# Patient Record
Sex: MALE | Race: ASIAN | NOT HISPANIC OR LATINO | Employment: FULL TIME | URBAN - METROPOLITAN AREA
[De-identification: names, ages, dates, MRNs, and addresses within clinical notes are randomized per-mention and may not be internally consistent; named-entity substitution may affect disease eponyms.]

---

## 2019-07-25 ENCOUNTER — OFFICE VISIT - HEALTHEAST (OUTPATIENT)
Dept: FAMILY MEDICINE | Facility: CLINIC | Age: 44
End: 2019-07-25

## 2019-07-25 DIAGNOSIS — M45.5 ANKYLOSING SPONDYLITIS OF THORACOLUMBAR REGION (H): ICD-10-CM

## 2019-07-25 DIAGNOSIS — Z00.00 ROUTINE GENERAL MEDICAL EXAMINATION AT A HEALTH CARE FACILITY: ICD-10-CM

## 2019-07-25 ASSESSMENT — MIFFLIN-ST. JEOR: SCORE: 1746.37

## 2019-09-09 ENCOUNTER — OFFICE VISIT - HEALTHEAST (OUTPATIENT)
Dept: RHEUMATOLOGY | Facility: CLINIC | Age: 44
End: 2019-09-09

## 2019-09-09 DIAGNOSIS — M54.41 CHRONIC MIDLINE LOW BACK PAIN WITH RIGHT-SIDED SCIATICA: ICD-10-CM

## 2019-09-09 DIAGNOSIS — G89.29 CHRONIC MIDLINE LOW BACK PAIN WITH RIGHT-SIDED SCIATICA: ICD-10-CM

## 2019-09-09 DIAGNOSIS — M45.0 ANKYLOSING SPONDYLITIS OF MULTIPLE SITES IN SPINE (H): ICD-10-CM

## 2019-09-09 LAB
ALBUMIN SERPL-MCNC: 3.3 G/DL (ref 3.5–5)
ALT SERPL W P-5'-P-CCNC: 13 U/L (ref 0–45)
BASOPHILS # BLD AUTO: 0.1 THOU/UL (ref 0–0.2)
BASOPHILS NFR BLD AUTO: 1 % (ref 0–2)
C REACTIVE PROTEIN LHE: 5.2 MG/DL (ref 0–0.8)
CREAT SERPL-MCNC: 0.73 MG/DL (ref 0.7–1.3)
EOSINOPHIL # BLD AUTO: 0.3 THOU/UL (ref 0–0.4)
EOSINOPHIL NFR BLD AUTO: 3 % (ref 0–6)
ERYTHROCYTE [DISTWIDTH] IN BLOOD BY AUTOMATED COUNT: 11.1 % (ref 11–14.5)
ERYTHROCYTE [SEDIMENTATION RATE] IN BLOOD BY WESTERGREN METHOD: 80 MM/HR (ref 0–15)
GFR SERPL CREATININE-BSD FRML MDRD: >60 ML/MIN/1.73M2
HCT VFR BLD AUTO: 39.8 % (ref 40–54)
HGB BLD-MCNC: 13.4 G/DL (ref 14–18)
LYMPHOCYTES # BLD AUTO: 2.4 THOU/UL (ref 0.8–4.4)
LYMPHOCYTES NFR BLD AUTO: 24 % (ref 20–40)
MCH RBC QN AUTO: 28.7 PG (ref 27–34)
MCHC RBC AUTO-ENTMCNC: 33.6 G/DL (ref 32–36)
MCV RBC AUTO: 85 FL (ref 80–100)
MONOCYTES # BLD AUTO: 0.6 THOU/UL (ref 0–0.9)
MONOCYTES NFR BLD AUTO: 6 % (ref 2–10)
NEUTROPHILS # BLD AUTO: 6.6 THOU/UL (ref 2–7.7)
NEUTROPHILS NFR BLD AUTO: 66 % (ref 50–70)
PLATELET # BLD AUTO: 353 THOU/UL (ref 140–440)
PMV BLD AUTO: 7 FL (ref 7–10)
RBC # BLD AUTO: 4.66 MILL/UL (ref 4.4–6.2)
WBC: 10 THOU/UL (ref 4–11)

## 2019-09-09 ASSESSMENT — MIFFLIN-ST. JEOR: SCORE: 1745.36

## 2019-09-10 LAB
ANA SER QL: 0.1 U
HBV SURFACE AG SERPL QL IA: NEGATIVE
HCV AB SERPL QL IA: NEGATIVE

## 2019-09-12 LAB
B LOCUS: NORMAL
B27TEST METHOD: NORMAL

## 2019-09-23 ENCOUNTER — OFFICE VISIT - HEALTHEAST (OUTPATIENT)
Dept: RHEUMATOLOGY | Facility: CLINIC | Age: 44
End: 2019-09-23

## 2019-09-23 ENCOUNTER — OFFICE VISIT - HEALTHEAST (OUTPATIENT)
Dept: FAMILY MEDICINE | Facility: CLINIC | Age: 44
End: 2019-09-23

## 2019-09-23 DIAGNOSIS — M54.41 CHRONIC MIDLINE LOW BACK PAIN WITH RIGHT-SIDED SCIATICA: ICD-10-CM

## 2019-09-23 DIAGNOSIS — N52.8 OTHER MALE ERECTILE DYSFUNCTION: ICD-10-CM

## 2019-09-23 DIAGNOSIS — Z00.00 ROUTINE GENERAL MEDICAL EXAMINATION AT A HEALTH CARE FACILITY: ICD-10-CM

## 2019-09-23 DIAGNOSIS — M45.0 ANKYLOSING SPONDYLITIS OF MULTIPLE SITES IN SPINE (H): ICD-10-CM

## 2019-09-23 DIAGNOSIS — M19.90 ARTHRITIS: ICD-10-CM

## 2019-09-23 DIAGNOSIS — G89.29 CHRONIC MIDLINE LOW BACK PAIN WITH RIGHT-SIDED SCIATICA: ICD-10-CM

## 2019-09-23 LAB
ALBUMIN SERPL-MCNC: 3.2 G/DL (ref 3.5–5)
ALBUMIN UR-MCNC: ABNORMAL MG/DL
ALP SERPL-CCNC: 78 U/L (ref 45–120)
ALT SERPL W P-5'-P-CCNC: 11 U/L (ref 0–45)
ANION GAP SERPL CALCULATED.3IONS-SCNC: 10 MMOL/L (ref 5–18)
APPEARANCE UR: CLEAR
AST SERPL W P-5'-P-CCNC: 13 U/L (ref 0–40)
BACTERIA #/AREA URNS HPF: ABNORMAL HPF
BILIRUB SERPL-MCNC: 0.2 MG/DL (ref 0–1)
BILIRUB UR QL STRIP: NEGATIVE
BUN SERPL-MCNC: 18 MG/DL (ref 8–22)
CALCIUM SERPL-MCNC: 9 MG/DL (ref 8.5–10.5)
CHLORIDE BLD-SCNC: 106 MMOL/L (ref 98–107)
CO2 SERPL-SCNC: 24 MMOL/L (ref 22–31)
COLOR UR AUTO: YELLOW
CREAT SERPL-MCNC: 0.73 MG/DL (ref 0.7–1.3)
GFR SERPL CREATININE-BSD FRML MDRD: >60 ML/MIN/1.73M2
GLUCOSE BLD-MCNC: 96 MG/DL (ref 70–125)
GLUCOSE UR STRIP-MCNC: NEGATIVE MG/DL
HBA1C MFR BLD: 6.1 % (ref 3.5–6)
HGB UR QL STRIP: ABNORMAL
KETONES UR STRIP-MCNC: NEGATIVE MG/DL
LEUKOCYTE ESTERASE UR QL STRIP: NEGATIVE
MUCOUS THREADS #/AREA URNS LPF: ABNORMAL LPF
NITRATE UR QL: NEGATIVE
PH UR STRIP: 5 [PH] (ref 5–8)
POTASSIUM BLD-SCNC: 4.1 MMOL/L (ref 3.5–5)
PROT SERPL-MCNC: 7.3 G/DL (ref 6–8)
RBC #/AREA URNS AUTO: ABNORMAL HPF
SODIUM SERPL-SCNC: 140 MMOL/L (ref 136–145)
SP GR UR STRIP: >=1.03 (ref 1–1.03)
SPERM #/AREA URNS HPF: PRESENT /[HPF]
SQUAMOUS #/AREA URNS AUTO: ABNORMAL LPF
UROBILINOGEN UR STRIP-ACNC: ABNORMAL
WBC #/AREA URNS AUTO: ABNORMAL HPF

## 2019-09-23 ASSESSMENT — MIFFLIN-ST. JEOR: SCORE: 1760.32

## 2019-09-24 ENCOUNTER — COMMUNICATION - HEALTHEAST (OUTPATIENT)
Dept: RHEUMATOLOGY | Facility: CLINIC | Age: 44
End: 2019-09-24

## 2019-09-24 ENCOUNTER — COMMUNICATION - HEALTHEAST (OUTPATIENT)
Dept: FAMILY MEDICINE | Facility: CLINIC | Age: 44
End: 2019-09-24

## 2019-09-24 DIAGNOSIS — M45.0 ANKYLOSING SPONDYLITIS OF MULTIPLE SITES IN SPINE (H): ICD-10-CM

## 2019-09-24 LAB
HBV SURFACE AG SERPL QL IA: NEGATIVE
HCV AB SERPL QL IA: NEGATIVE

## 2019-09-25 LAB
GAMMA INTERFERON BACKGROUND BLD IA-ACNC: 0.1 IU/ML
M TB IFN-G BLD-IMP: NEGATIVE
MITOGEN IGNF BCKGRD COR BLD-ACNC: -0.03 IU/ML
MITOGEN IGNF BCKGRD COR BLD-ACNC: -0.04 IU/ML
QTF INTERPRETATION: NORMAL
QTF MITOGEN - NIL: 5.61 IU/ML

## 2019-09-30 ENCOUNTER — HEALTH MAINTENANCE LETTER (OUTPATIENT)
Age: 44
End: 2019-09-30

## 2019-11-07 ENCOUNTER — COMMUNICATION - HEALTHEAST (OUTPATIENT)
Dept: FAMILY MEDICINE | Facility: CLINIC | Age: 44
End: 2019-11-07

## 2019-11-07 DIAGNOSIS — M45.5 ANKYLOSING SPONDYLITIS OF THORACOLUMBAR REGION (H): ICD-10-CM

## 2019-11-07 DIAGNOSIS — Z00.00 ROUTINE GENERAL MEDICAL EXAMINATION AT A HEALTH CARE FACILITY: ICD-10-CM

## 2020-01-06 ENCOUNTER — OFFICE VISIT - HEALTHEAST (OUTPATIENT)
Dept: RHEUMATOLOGY | Facility: CLINIC | Age: 45
End: 2020-01-06

## 2020-01-06 DIAGNOSIS — Z96.643 HISTORY OF BILATERAL HIP ARTHROPLASTY: ICD-10-CM

## 2020-01-06 DIAGNOSIS — M45.8 ANKYLOSING SPONDYLITIS OF SACRAL REGION (H): ICD-10-CM

## 2020-01-06 DIAGNOSIS — M47.816 LUMBAR SPONDYLOSIS: ICD-10-CM

## 2020-01-06 LAB
ALBUMIN SERPL-MCNC: 3.8 G/DL (ref 3.5–5)
ALT SERPL W P-5'-P-CCNC: 26 U/L (ref 0–45)
CREAT SERPL-MCNC: 0.79 MG/DL (ref 0.7–1.3)
ERYTHROCYTE [DISTWIDTH] IN BLOOD BY AUTOMATED COUNT: 12.6 % (ref 11–14.5)
GFR SERPL CREATININE-BSD FRML MDRD: >60 ML/MIN/1.73M2
HCT VFR BLD AUTO: 48.4 % (ref 40–54)
HGB BLD-MCNC: 16.1 G/DL (ref 14–18)
MCH RBC QN AUTO: 29.7 PG (ref 27–34)
MCHC RBC AUTO-ENTMCNC: 33.3 G/DL (ref 32–36)
MCV RBC AUTO: 89 FL (ref 80–100)
PLATELET # BLD AUTO: 238 THOU/UL (ref 140–440)
PMV BLD AUTO: 7.8 FL (ref 7–10)
RBC # BLD AUTO: 5.42 MILL/UL (ref 4.4–6.2)
WBC: 7.4 THOU/UL (ref 4–11)

## 2020-01-06 ASSESSMENT — MIFFLIN-ST. JEOR: SCORE: 1791.17

## 2020-01-07 ENCOUNTER — COMMUNICATION - HEALTHEAST (OUTPATIENT)
Dept: RHEUMATOLOGY | Facility: CLINIC | Age: 45
End: 2020-01-07

## 2020-03-06 ENCOUNTER — COMMUNICATION - HEALTHEAST (OUTPATIENT)
Dept: FAMILY MEDICINE | Facility: CLINIC | Age: 45
End: 2020-03-06

## 2020-03-06 DIAGNOSIS — M45.5 ANKYLOSING SPONDYLITIS OF THORACOLUMBAR REGION (H): ICD-10-CM

## 2020-03-06 DIAGNOSIS — Z00.00 ROUTINE GENERAL MEDICAL EXAMINATION AT A HEALTH CARE FACILITY: ICD-10-CM

## 2020-07-06 ENCOUNTER — AMBULATORY - HEALTHEAST (OUTPATIENT)
Dept: LAB | Facility: CLINIC | Age: 45
End: 2020-07-06

## 2020-07-06 DIAGNOSIS — M45.8 ANKYLOSING SPONDYLITIS OF SACRAL REGION (H): ICD-10-CM

## 2020-07-06 LAB
ALBUMIN SERPL-MCNC: 3.9 G/DL (ref 3.5–5)
ALT SERPL W P-5'-P-CCNC: 29 U/L (ref 0–45)
CREAT SERPL-MCNC: 0.8 MG/DL (ref 0.7–1.3)
ERYTHROCYTE [DISTWIDTH] IN BLOOD BY AUTOMATED COUNT: 11.7 % (ref 11–14.5)
GFR SERPL CREATININE-BSD FRML MDRD: >60 ML/MIN/1.73M2
HCT VFR BLD AUTO: 46 % (ref 40–54)
HGB BLD-MCNC: 16.1 G/DL (ref 14–18)
MCH RBC QN AUTO: 31.2 PG (ref 27–34)
MCHC RBC AUTO-ENTMCNC: 35 G/DL (ref 32–36)
MCV RBC AUTO: 89 FL (ref 80–100)
PLATELET # BLD AUTO: 225 THOU/UL (ref 140–440)
PMV BLD AUTO: 7.8 FL (ref 7–10)
RBC # BLD AUTO: 5.17 MILL/UL (ref 4.4–6.2)
WBC: 7 THOU/UL (ref 4–11)

## 2020-07-09 ENCOUNTER — OFFICE VISIT - HEALTHEAST (OUTPATIENT)
Dept: RHEUMATOLOGY | Facility: CLINIC | Age: 45
End: 2020-07-09

## 2020-07-09 DIAGNOSIS — Z96.643 HISTORY OF BILATERAL HIP ARTHROPLASTY: ICD-10-CM

## 2020-07-09 DIAGNOSIS — M45.8 ANKYLOSING SPONDYLITIS OF SACRAL REGION (H): ICD-10-CM

## 2020-07-09 DIAGNOSIS — M47.816 LUMBAR SPONDYLOSIS: ICD-10-CM

## 2020-10-01 ENCOUNTER — COMMUNICATION - HEALTHEAST (OUTPATIENT)
Dept: RHEUMATOLOGY | Facility: CLINIC | Age: 45
End: 2020-10-01

## 2020-10-01 DIAGNOSIS — M45.8 ANKYLOSING SPONDYLITIS OF SACRAL REGION (H): ICD-10-CM

## 2020-10-02 ENCOUNTER — COMMUNICATION - HEALTHEAST (OUTPATIENT)
Dept: RHEUMATOLOGY | Facility: CLINIC | Age: 45
End: 2020-10-02

## 2021-01-15 ENCOUNTER — HEALTH MAINTENANCE LETTER (OUTPATIENT)
Age: 46
End: 2021-01-15

## 2021-02-25 ENCOUNTER — COMMUNICATION - HEALTHEAST (OUTPATIENT)
Dept: FAMILY MEDICINE | Facility: CLINIC | Age: 46
End: 2021-02-25

## 2021-02-25 DIAGNOSIS — M45.5 ANKYLOSING SPONDYLITIS OF THORACOLUMBAR REGION (H): ICD-10-CM

## 2021-02-25 DIAGNOSIS — Z00.00 ROUTINE GENERAL MEDICAL EXAMINATION AT A HEALTH CARE FACILITY: ICD-10-CM

## 2021-04-12 ENCOUNTER — COMMUNICATION - HEALTHEAST (OUTPATIENT)
Dept: RHEUMATOLOGY | Facility: CLINIC | Age: 46
End: 2021-04-12

## 2021-04-12 DIAGNOSIS — M45.8 ANKYLOSING SPONDYLITIS OF SACRAL REGION (H): ICD-10-CM

## 2021-04-15 ENCOUNTER — COMMUNICATION - HEALTHEAST (OUTPATIENT)
Dept: ADMINISTRATIVE | Facility: CLINIC | Age: 46
End: 2021-04-15

## 2021-04-21 ENCOUNTER — AMBULATORY - HEALTHEAST (OUTPATIENT)
Dept: LAB | Facility: CLINIC | Age: 46
End: 2021-04-21

## 2021-04-21 DIAGNOSIS — M45.8 ANKYLOSING SPONDYLITIS OF SACRAL REGION (H): ICD-10-CM

## 2021-04-21 LAB
ALBUMIN SERPL-MCNC: 4 G/DL (ref 3.5–5)
ALT SERPL W P-5'-P-CCNC: 27 U/L (ref 0–45)
CREAT SERPL-MCNC: 0.8 MG/DL (ref 0.7–1.3)
ERYTHROCYTE [DISTWIDTH] IN BLOOD BY AUTOMATED COUNT: 11.6 % (ref 11–14.5)
GFR SERPL CREATININE-BSD FRML MDRD: >60 ML/MIN/1.73M2
HCT VFR BLD AUTO: 47.2 % (ref 40–54)
HGB BLD-MCNC: 15.9 G/DL (ref 14–18)
MCH RBC QN AUTO: 29.9 PG (ref 27–34)
MCHC RBC AUTO-ENTMCNC: 33.7 G/DL (ref 32–36)
MCV RBC AUTO: 89 FL (ref 80–100)
PLATELET # BLD AUTO: 225 THOU/UL (ref 140–440)
PMV BLD AUTO: 9.4 FL (ref 7–10)
RBC # BLD AUTO: 5.31 MILL/UL (ref 4.4–6.2)
WBC: 7.2 THOU/UL (ref 4–11)

## 2021-04-22 ENCOUNTER — COMMUNICATION - HEALTHEAST (OUTPATIENT)
Dept: ADMINISTRATIVE | Facility: CLINIC | Age: 46
End: 2021-04-22

## 2021-05-04 ENCOUNTER — RECORDS - HEALTHEAST (OUTPATIENT)
Dept: RHEUMATOLOGY | Facility: CLINIC | Age: 46
End: 2021-05-04

## 2021-05-11 ENCOUNTER — OFFICE VISIT - HEALTHEAST (OUTPATIENT)
Dept: RHEUMATOLOGY | Facility: CLINIC | Age: 46
End: 2021-05-11

## 2021-05-11 DIAGNOSIS — Z96.643 HISTORY OF BILATERAL HIP ARTHROPLASTY: ICD-10-CM

## 2021-05-11 DIAGNOSIS — M47.816 LUMBAR SPONDYLOSIS: ICD-10-CM

## 2021-05-11 DIAGNOSIS — M45.8 ANKYLOSING SPONDYLITIS OF SACRAL REGION (H): ICD-10-CM

## 2021-05-12 ENCOUNTER — COMMUNICATION - HEALTHEAST (OUTPATIENT)
Dept: RHEUMATOLOGY | Facility: CLINIC | Age: 46
End: 2021-05-12

## 2021-05-30 NOTE — PROGRESS NOTES
Assessment:     1. Ankylosing spondylitis of thoracolumbar region (H)  Ambulatory referral to Rheumatology    naproxen (NAPROSYN) 500 MG tablet   2. Routine general medical examination at a health care facility  Ambulatory referral to Rheumatology    naproxen (NAPROSYN) 500 MG tablet       Plan:     1. Ankylosing spondylitis of thoracolumbar region (H)  Patient will discontinue ibuprofen products and Celebrex taken extra strength Tylenol in addition to the following; we discouraged the use of his oxycodone tablets which were prescribed for hemorrhoid treatment  - Ambulatory referral to Rheumatology  - naproxen (NAPROSYN) 500 MG tablet; Take 1 tablet (500 mg total) by mouth 2 (two) times a day with meals.  Dispense: 60 tablet; Refill: 2    2. Routine general medical examination at a health care facility  Patient will return for complete examination after seeing rheumatology  - Ambulatory referral to Rheumatology  - naproxen (NAPROSYN) 500 MG tablet; Take 1 tablet (500 mg total) by mouth 2 (two) times a day with meals.  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  Patient has a long history of severe ankylosing spondylitis of his thoracolumbar spine and has been on multiple anti-inflammatories over the last few years but under the care of [facility].  He can no longer see his rheumatologist and that plan and is looking to establish care here at [facility] with primary care as well as rheumatology.  Patient is in moderate to moderately severe pain.  He recently had a thrombosed hemorrhoid I&D done at [facility] and has some Percocet tablets which she is taking for pain in addition to Celebrex and round-the-clock ibuprofen because he is having a flare of severe stiffness with his ankylosing spondylitis.  Patient has been on Enbrel shots but no  "longer can take it because it is not covered by [employer] insurance and this is his new employer.  He now works for the [employer] system.  We will get him a referral to Dr. Yovani Moe I in the meantime have placed him on some Naprosyn 500 twice daily with an extra strength Tylenol discontinue the Celebrex and the round-the-clock ibuprofen.  I am concerned he may be getting some liver damage and the risk of GI disturbance with so much ibuprofen is present.  Patient had previous knee surgery as a child.  He has not really had a primary care examination in many years.  We really need to get an emergent type of rheumatology consultation and perhaps some corticosteroid treatment for him.  Also rheumatology may be able to source his Enbrel or at least provide a pharmacologic evaluation where we may get him covered again through his insurance.  I will follow along with our consultants and see him for primary care evaluation.  System review otherwise unremarkable patient wears glasses he denies any hearing loss dysphasia shortness of breath dyspnea chest pain no abdominal pain diarrhea constipation no real side effects from Enbrel although we do not have his clinical records able to download here at this moment.  If and when he returns for primary care complete examination we will of course have had opportunity to review his entire past medical history.  40-minute examination face-to-face today.    Review of Systems: A complete 14 point review of systems was obtained and is negative or as stated in the history of present illness.    No past medical history on file.  No family history on file.  No past surgical history on file.  Social History     Tobacco Use     Smoking status: Never Smoker     Smokeless tobacco: Never Used   Substance Use Topics     Alcohol use: Not on file     Drug use: Not on file         Objective:   /76   Pulse 67   Ht 5' 9.75\" (1.772 m)   Wt 191 lb 9.6 oz (86.9 kg)   SpO2 97%   BMI " 27.69 kg/m      General Appearance:  Alert, cooperative, no distress  Head:  Normocephalic, no obvious abnormality  Ears: TM anatomy normal  Eyes:  PERRL, EOM's intact, conjunctiva and corneas clear  Nose:  Nares symmetrical, septum midline, mucosa pink, no sinus tenderness  Throat:  Lips, tongue, and mucosa are moist, pink, and intact  Neck:  Supple, symmetrical, trachea midline, no adenopathy; thyroid: no enlargement, symmetric,no tenderness/mass/nodules; no carotid bruit, no JVD  Back:  Symmetrical, no curvature, ROM normal, no CVA tenderness  Chest/Breast:  No mass or tenderness  Lungs:  Clear to auscultation bilaterally, respirations unlabored   Heart:  Normal PMI, regular rate & rhythm, S1 and S2 normal, no murmurs, rubs, or gallops  Abdomen:  Soft, non-tender, bowel sounds active all four quadrants, no mass, or organomegaly  Musculoskeletal:  Tone and strength strong and symmetrical, all extremities left knee has jagged well-healed scar from previous knee surgery  Lymphatic:  No adenopathy  Skin/Hair/Nails:  Skin warm, dry, and intact, no rashes  Neurologic:  Alert and oriented x3, no cranial nerve deficits, normal strength and tone, gait steady  Extremities:  No edema.  Jase's sign negative.    Genitourinary: deferred  Pulses:  Equal bilaterally           This note has been dictated using voice recognition software. Any grammatical or context distortions are unintentional and inherent to the the software.

## 2021-06-01 NOTE — PROGRESS NOTES
ASSESSMENT AND PLAN:  Jesus Olivares 44 y.o. male is seen here on 09/09/19 for evaluation of back pain, previous diagnosis of ankylosing spondylitis that was arrived back in 1994, bilateral hip arthroplasty 1997 June in December, right and then left, with modest improvement with naproxen, being of Enbrel since April of this year due to insurance reasons.  When he is on Enbrel he noted 90% improvement in his symptoms.  I have had the opportunity to review his data going back to several years from the time he was seen at the [facility] and subsequently at [facility].  Reviewed the x-ray reports from early 2000.  Today we discussed various aspects of his care and symptoms.  I have suggested that we take basic labs as noted, and x-rays.  Diagnoses and all orders for this visit:    Chronic midline low back pain with right-sided sciatica  -     HM1(CBC and Differential)  -     Creatinine  -     ALT (SGPT)  -     Albumin  -     Hepatitis C Antibody (Anti-HCV)  -     Erythrocyte Sedimentation Rate  -     C-Reactive Protein  -     Antinuclear Antibody (CAMMIE) Cascade  -     Hepatitis B Surface Antigen (HBsAG)  -     XR Lumbar Spine 2 or 3 VWS; Future; Expected date: 09/09/2019  -     XR Sacroliac Joints 3 Or More VWS; Future; Expected date: 09/09/2019  -     XR Cervical Spine 2 - 3 VWS; Future; Expected date: 09/09/2019  -     XR Thoracic Spine 3 VWS; Future; Expected date: 09/09/2019  -     XR Lumbar Spine 2 or 3 VWS  -     XR Sacroliac Joints 3 Or More VWS  -     XR Cervical Spine 2 - 3 VWS  -     XR Thoracic Spine 3 VWS  -     HM1 (CBC with Diff)  -     HLA-B27 Antigen    Ankylosing spondylitis of multiple sites in spine (H)  -     HM1(CBC and Differential)  -     Creatinine  -     ALT (SGPT)  -     Albumin  -     Hepatitis C Antibody (Anti-HCV)  -     Erythrocyte Sedimentation Rate  -     C-Reactive Protein  -     Antinuclear Antibody (CAMMIE) Cascade  -     Hepatitis B Surface Antigen (HBsAG)  -     XR  Lumbar Spine 2 or 3 VWS; Future; Expected date: 09/09/2019  -     XR Sacroliac Joints 3 Or More VWS; Future; Expected date: 09/09/2019  -     XR Cervical Spine 2 - 3 VWS; Future; Expected date: 09/09/2019  -     XR Thoracic Spine 3 VWS; Future; Expected date: 09/09/2019  -     XR Lumbar Spine 2 or 3 VWS  -     XR Sacroliac Joints 3 Or More VWS  -     XR Cervical Spine 2 - 3 VWS  -     XR Thoracic Spine 3 VWS  -     HM1 (CBC with Diff)  -     HLA-B27 Antigen      HISTORY OF PRESENTING ILLNESS:  Jesus Olivares, 44 y.o., male is here for establishment of care of back pain, diagnosis of ankylosing spondylitis.  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  He reports that his symptoms began in 1994.  He recalls pain having started on the right hip area he points to the trochanteric region than the left side was affected to, over the subsequent years he had tried various modalities including sulfasalazine, by June 1997 he had a right hip arthroplasty and in the same year and December the left hip arthroplasty.  He recalls initially he was started on sulfasalazine nonsteroidals which provided him some relief.  Then he was given Enbrel that he found quite helpful Humira was tried it that was not of much use he was then put back on Enbrel that he has been on for many years apart from the intermittent times aware the insurance change necessitated not taking it such as now.  His most recent Enbrel intake was in April of this year.  Over the past 5 months he has managed the symptoms taking naproxen, Tylenol.  This provides him with perhaps a 40% relief.  He noted that some of the worst pains are in the neck between this shoulder blades in the lower back.  When he sits for too long the pain does radiate down the right leg sometimes all the way to the foot.  Nighttime the pain is severe enough to keep him up, morning stiffness can last 2 to 3 hours.  He has not observed pain in other  joint areas in any of the appendicular system.  He has not observed swelling.  He has not seen features suggestive of dactylitis.  He has no features suggestive of enthesitis of the peripheral joints.  He recalls no features suggestive of uveitis/iritis, personal or family history of psoriasis ulcerative colitis or Crohn's disease.  When he was on Enbrel he felt that there was a 90% improvement in his pain and stiffness.  He is not a smoker alcohol 1-2 times per week socially.  Likes to swim and walk regularly.  He has had some constipation and the GI symptoms, fatigue, generalized weakness, headaches and dizziness, hair loss.   Further historical information and ADL limitations as noted in the multidimensional health assessment questionnaire attached in the EMR. Rest of the 13 system ROS is negative.     ALLERGIES:Patient has no known allergies.    PAST MEDICAL/ACTIVE PROBLEMS/MEDICATION/ FAMILY HISTORY/SOCIAL DATA:  The patient has a family history of  No past medical history on file.  Social History     Tobacco Use   Smoking Status Never Smoker   Smokeless Tobacco Never Used     There is no problem list on file for this patient.    Current Outpatient Medications   Medication Sig Dispense Refill     acetaminophen (TYLENOL) 500 MG tablet Take 500 mg by mouth every 6 (six) hours as needed for pain.       celecoxib (CELEBREX) 200 MG capsule Take 200 mg by mouth 2 (two) times a day.       ibuprofen (ADVIL,MOTRIN) 800 MG tablet Take 800 mg by mouth every 6 (six) hours as needed for pain.       naproxen (NAPROSYN) 500 MG tablet Take 1 tablet (500 mg total) by mouth 2 (two) times a day with meals. 60 tablet 2     oxyCODONE (ROXICODONE) 5 MG immediate release tablet Take 5 mg by mouth every 4 (four) hours as needed for pain.       No current facility-administered medications for this visit.        COMPREHENSIVE EXAMINATION:  Vitals:    09/09/19 0900   BP: (!) 120/100   Patient Site: Right Arm   Patient Position: Sitting  "  Cuff Size: Adult Large   Pulse: 84   Weight: 194 lb (88 kg)   Height: 5' 9\" (1.753 m)     A well appearing alert oriented male. Vital data as noted above. His eyes without inflammation/scleromalacia. ENTwithout oral mucositis, thrush, nasal deformity, external ear redness, deformity. His neck is without lymphadenopathy and supple. Lungs normal sounds, no pleural rub. Heart auscultation normal rate, rhythm; no pericardial rub and murmurs. Abdomen soft, non tender, no organomegaly. Skin examined for heliotrope, malar area eruption, lupus pernio, periungual erythema, sclerodactyly, papules, erythema nodosum, purpura, nail pitting, onycholysis, and obvious psoriasis lesion. Neurological examination shows normal alertness, speech, facial symmetry, tone and power in upper and lower extremities. The joint examination is performed for swelling, tenderness, warmth, erythema, and range of motion in the following joints: DIPs, PIPs, MCPs, wrists, first CMC's, elbows, shoulders, hips, knees, ankles, feet; spine for range of motion and paraspinal muscles for tenderness. The salient  findings are: He does not have synovitis in any of the palpable joints of upper and lower extremities.  There is no dactylitis of digits or toes.  There is no enthesitis around the Achilles, knees.  His occiput to wall is 0.  Schober's goes from 10 cm to 14.5.  Lateral rotation is intact.    LAB / IMAGING DATA:  No results found for: ALT, ALBUMIN, CREATININE    No results found for: WBC, HGB, PLT    No results found for: CAMMIE, RF, SEDRATE       "

## 2021-06-01 NOTE — PROGRESS NOTES
"ASSESSMENT AND PLAN:  Jesus Olivares 44 y.o. male is seen here on 09/23/19 for follow-up.  He has uncontrolled ankylosing spondylitis, first diagnosed 20 years ago at [facility], with the damage to the sacroiliac joints, which had been very well controlled with Enbrel, \"90% improvement\" however the insurance does not cover it anymore.  He wonders about other options, Cosentyx as discussed.  Major side effects outlined.  Follow-up 3 months.       Diagnoses and all orders for this visit:    Ankylosing spondylitis of multiple sites in spine (H)  -     QTF-Mycobacterium tuberculosis by QuantiFERON-TB Gold Plus  -     Hepatitis C Antibody (Anti-HCV)  -     Hepatitis B Surface Antigen (HBsAG)  -     secukinumab (COSENTYX) 150 mg/mL Syrg; Inject 150 mg under the skin every 7 days for 5 days. And then 150 mg q. 4-week.  Dispense: 5 Syringe; Refill: 1    Chronic midline low back pain with right-sided sciatica  -     QTF-Mycobacterium tuberculosis by QuantiFERON-TB Gold Plus  -     Hepatitis C Antibody (Anti-HCV)  -     Hepatitis B Surface Antigen (HBsAG)      HISTORY OF PRESENTING ILLNESS:  Jesus Olivares, 44 y.o., male is here for follow-up of recent visit for establishment of care of back pain, diagnosis of ankylosing spondylitis.  Recent work-up was reviewed.  His pain continues.  He has not been able to get Enbrel for insurance reasons.  He has taken over-the-counter measures including 800 mg of ibuprofen without much help.  Naproxen does not take the edge off.  He has elevated sed rate and CRP.  X-rays of the sacroiliac joints show sacroiliitis.  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  He reports that his symptoms began in 1994.  He recalls pain having started on the right hip area he points to the trochanteric region than the left side was affected to, over the subsequent years he had tried various modalities including sulfasalazine, by June 1997 he had a " right hip arthroplasty and in the same year and December the left hip arthroplasty.  He recalls initially he was started on sulfasalazine nonsteroidals which provided him some relief.  Then he was given Enbrel that he found quite helpful Humira was tried it that was not of much use he was then put back on Enbrel that he has been on for many years apart from the intermittent times aware the insurance change necessitated not taking it such as now.  His most recent Enbrel intake was in April of this year.  Over the past 5 months he has managed the symptoms taking naproxen, Tylenol.  This provides him with perhaps a 40% relief.  He noted that some of the worst pains are in the neck between this shoulder blades in the lower back.  When he sits for too long the pain does radiate down the right leg sometimes all the way to the foot.  Nighttime the pain is severe enough to keep him up, morning stiffness can last 2 to 3 hours.  He has not observed pain in other joint areas in any of the appendicular system.  He has not observed swelling.  He has not seen features suggestive of dactylitis.  He has no features suggestive of enthesitis of the peripheral joints.  He recalls no features suggestive of uveitis/iritis, personal or family history of psoriasis ulcerative colitis or Crohn's disease.  When he was on Enbrel he felt that there was a 90% improvement in his pain and stiffness.  He is not a smoker alcohol 1-2 times per week socially.  Likes to swim and walk regularly.  He has had some constipation and the GI symptoms, fatigue, generalized weakness, headaches and dizziness, hair loss.   Further historical information and ADL limitations as noted in the multidimensional health assessment questionnaire attached in the EMR.  ALLERGIES:Patient has no known allergies.    PAST MEDICAL/ACTIVE PROBLEMS/MEDICATION/ FAMILY HISTORY/SOCIAL DATA:  The patient has a family history of  No past medical history on file.  Social History      Tobacco Use   Smoking Status Never Smoker   Smokeless Tobacco Never Used     There is no problem list on file for this patient.    Current Outpatient Medications   Medication Sig Dispense Refill     acetaminophen (TYLENOL) 500 MG tablet Take 500 mg by mouth every 6 (six) hours as needed for pain.       sildenafil (REVATIO) 20 mg tablet Take 1-5 tablets on demand one hour prior to planned intercourse 30 tablet 6     No current facility-administered medications for this visit.      DETAILED EXAMINATION  09/23/19  :  Vitals:    09/23/19 1043   BP: 112/68   Patient Site: Right Arm   Patient Position: Sitting   Cuff Size: Adult Large   Pulse: 80   Weight: 193 lb (87.5 kg)     Alert oriented. Head including the face is examined for malar rash, heliotropes, scarring, lupus pernio. Eyes examined for redness such as in episcleritis/scleritis, periorbital lesions.   Neck/ Face examined for parotid gland swelling, range of motion of neck.  Left upper and lower and right upper and lower extremities examined for tenderness, swelling, warmth of the appendicular joints, range of motion, edema, rash.  Some of the important findings included: he does not have evidence of synovitis in the palpable joints of the upper extremities.  No significant deformities of the digits.  no Heberden nodes.  Range of motion of the shoulders show full abduction.  No JLT effusion or warmth of the knees.  No synovitis and palpable joints of upper extremities kne     LAB / IMAGING DATA:  ALT   Date Value Ref Range Status   09/09/2019 13 0 - 45 U/L Final     Albumin   Date Value Ref Range Status   09/09/2019 3.3 (L) 3.5 - 5.0 g/dL Final     Creatinine   Date Value Ref Range Status   09/09/2019 0.73 0.70 - 1.30 mg/dL Final       WBC   Date Value Ref Range Status   09/09/2019 10.0 4.0 - 11.0 thou/uL Final     Hemoglobin   Date Value Ref Range Status   09/09/2019 13.4 (L) 14.0 - 18.0 g/dL Final     Platelets   Date Value Ref Range Status   09/09/2019  353 140 - 440 [facility]/ Final       Lab Results   Component Value Date    SEDRATE 80 (H) 09/09/2019

## 2021-06-01 NOTE — PROGRESS NOTES
CC: I am here for a physical.  My ankylosing spondylitis is bothering me.  I have problems with the ED    HPI: Jesus is being seen her primary care for complete physical examination.  Is under the care of Dr. Yovani Moe for migratory type of ankylosing spondylitis work-up has been completed he is seeing him today at 1020 hopefully for new treatment.  I did provide him with some anti-inflammatory treatment which is had minimal effect.  We will follow along with our consultant.  Patient's main complaint is that he does have erectile dysfunction his wife is 2 years older than he is in.  He is a neuropsychiatric counselor.  We did discuss erectile dysfunction at length.  He apparently has difficulty getting an erection and also keeping an erection but the overall performance is suffering.  We have offered to refill his Revatio which was prescribed by urologist years ago.  Patient has concerns about type 2 diabetes and we have ordered an A1c and comprehensive profile to rule out those types of problems.  The patient's main complaint is pain especially in his neck is had surgery on his knees and hips for ankylosing spondylitis.  Hopefully he is looking at an antimetabolite type of medication therapy to help with his discomfort.  He denies any weight change weight has been up he is physically active does a lot of swimming has difficulty running however due to his spondylitis deformities.  Denies any visual disturbances hearing loss dysphasia shortness of breath dyspnea chest pain angina abdominal pain diarrhea constipation urgency frequency dysuria.  Recent hemorrhoidectomy 3 months ago external and internal hemorrhoids.  All medical questions asked and answered.  Counseling given for erectile dysfunction.      [unfilled]    No past medical history on file.  No family history on file.  No past surgical history on file.  Social History     Tobacco Use     Smoking status: Never Smoker     Smokeless tobacco: Never Used  "  Substance Use Topics     Alcohol use: Yes     Frequency: 2-3 times a week     Drug use: Not on file       PE:   /70   Pulse 80   Ht 5' 10\" (1.778 m)   Wt 193 lb 12.8 oz (87.9 kg)   BMI 27.81 kg/m       General Appearance:  Alert, cooperative, no distress  Head:  Normocephalic, no obvious abnormality  Ears: TM anatomy normal  Eyes:  PERRL, EOM's intact, conjunctiva and corneas clear  Nose:  Nares symmetrical, septum midline, mucosa pink, no sinus tenderness  Throat:  Lips, tongue, and mucosa are moist, pink, and intact  Neck:  Supple, symmetrical, trachea midline, no adenopathy; thyroid: no enlargement, symmetric,no tenderness/mass/nodules; no carotid bruit, no JVD  Back:  Symmetrical, no curvature, ROM normal, no CVA tenderness  Chest/Breast:  No mass or tenderness  Lungs:  Clear to auscultation bilaterally, respirations unlabored   Heart:  Normal PMI, regular rate & rhythm, S1 and S2 normal, no murmurs, rubs, or gallops  Abdomen:  Soft, non-tender, bowel sounds active all four quadrants, no mass, or organomegaly  Musculoskeletal:  Tone and strength strong and symmetrical, all extremities  Lymphatic:  No adenopathy  Skin/Hair/Nails:  Skin warm, dry, and intact, no rashes  Neurologic:  Alert and oriented x3, no cranial nerve deficits, normal strength and tone, gait steady  Extremities:  No edema.  Jase's sign negative.    Genitourinary: Uncircumcised testes normal no varicocele no hernia rectal exam prostate small at 40 mL  Pulses:  Equal bilaterally    Impression:     1. Arthritis  Comprehensive Metabolic Panel   2. Routine general medical examination at a health care facility  Comprehensive Metabolic Panel    Glycosylated Hemoglobin A1c    Urinalysis-UC if Indicated        PLAN:   1. Routine general medical examination at a health care facility  Work-up to include  - Comprehensive Metabolic Panel  - Glycosylated Hemoglobin A1c  - Urinalysis-UC if Indicated    2. Arthritis    - Comprehensive Metabolic " Panel      I have had an Advance Directives discussion with the patient.        This note has been dictated using voice recognition software. Any grammatical or context distortions are unintentional and inherent to the the software.

## 2021-06-03 VITALS
BODY MASS INDEX: 28.72 KG/M2 | SYSTOLIC BLOOD PRESSURE: 130 MMHG | HEART RATE: 78 BPM | DIASTOLIC BLOOD PRESSURE: 84 MMHG | HEIGHT: 70 IN | WEIGHT: 200.6 LBS

## 2021-06-03 VITALS
SYSTOLIC BLOOD PRESSURE: 122 MMHG | HEIGHT: 69 IN | BODY MASS INDEX: 28.73 KG/M2 | HEART RATE: 84 BPM | WEIGHT: 194 LBS | DIASTOLIC BLOOD PRESSURE: 90 MMHG

## 2021-06-03 VITALS
HEIGHT: 70 IN | WEIGHT: 193.8 LBS | BODY MASS INDEX: 27.75 KG/M2 | HEART RATE: 80 BPM | SYSTOLIC BLOOD PRESSURE: 122 MMHG | DIASTOLIC BLOOD PRESSURE: 70 MMHG

## 2021-06-03 VITALS
DIASTOLIC BLOOD PRESSURE: 68 MMHG | SYSTOLIC BLOOD PRESSURE: 112 MMHG | WEIGHT: 193 LBS | HEART RATE: 80 BPM | BODY MASS INDEX: 27.69 KG/M2

## 2021-06-03 VITALS — WEIGHT: 191.6 LBS | BODY MASS INDEX: 27.43 KG/M2 | HEIGHT: 70 IN

## 2021-06-03 NOTE — TELEPHONE ENCOUNTER
RN cannot approve Refill Request    RN can NOT refill this medication med is not covered by policy/route to provider. Last office visit: 7/25/2019 Edgard Arriaga MD Last Physical: 9/23/2019 Last MTM visit: Visit date not found Last visit same specialty: 7/25/2019 Edgard Arriaga MD.  Next visit within 3 mo: Visit date not found  Next physical within 3 mo: Visit date not found      Fauzia Barton, [facility] Triage/Med Refill 11/7/2019    Requested Prescriptions   Pending Prescriptions Disp Refills     naproxen (NAPROSYN) 500 MG tablet [Pharmacy Med Name: NAPROXEN 500 MG TABLET] 60 tablet 2     Sig: TAKE 1 TABLET BY MOUTH TWICE A DAY WITH MEALS       There is no refill protocol information for this order

## 2021-06-04 NOTE — PROGRESS NOTES
ASSESSMENT AND PLAN:  Jesus Olivares 44 y.o. male is seen here on 01/06/20 for follow-up.  He has ankylosing spondylitis, lumbar spondylosis, status post bilateral hip arthroplasties, on Cosentyx 150 mg q. 28 days, has tolerated this well, has found it to be effective, no reported side effects apart from occasional diarrhea.  Stay the course.  Follow-up here in 6 months.            Diagnoses and all orders for this visit:    Ankylosing spondylitis of sacral region (H)  -     secukinumab (COSENTYX) 150 mg/mL Syrg; Inject 150 mg under the skin every 28 days.  Dispense: 150 mL; Refill: 5  -     ALT (SGPT); Standing  -     Albumin; Standing  -     Creatinine; Standing  -     HM2(CBC w/o Differential); Standing  -     ALT (SGPT)  -     Albumin  -     Creatinine  -     HM2(CBC w/o Differential)    History of bilateral hip arthroplasty    Lumbar spondylosis      HISTORY OF PRESENTING ILLNESS:  Jesus Olivares, 44 y.o., male is here for follow-up.  He has ankylosing spondylitis, affecting sacroiliac joints, this is longstanding originally diagnosed at the [facility] in 1990s, he had done quite well with Enbrel which his insurance stopped providing.  He was started on Cosentyx he has tolerated this well.  He gets a daylong diarrhea after a few days of this injection.  There are no features suggest inflammatory bowel disease.  He noted no swelling of the joints.  His mild discomfort in the lower back.  Able to do most of his day-to-day activities without any with some difficulty stiffness still there 2 hours.  He takes Naprosyn.   He has elevated sed rate and CRP.  X-rays of the sacroiliac joints show sacroiliitis.  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  He reports that his symptoms began in 1994.  He recalls pain having started on the right hip area he points to the trochanteric region than the left side was affected to, over the subsequent years he had tried  various modalities including sulfasalazine, by June 1997 he had a right hip arthroplasty and in the same year and December the left hip arthroplasty.  He recalls initially he was started on sulfasalazine nonsteroidals which provided him some relief.  Then he was given Enbrel that he found quite helpful Humira was tried it that was not of much use he was then put back on Enbrel that he has been on for many years apart from the intermittent times aware the insurance change necessitated not taking it such as now.  His most recent Enbrel intake was in April of this year.  Over the past 5 months he has managed the symptoms taking naproxen, Tylenol.  This provides him with perhaps a 40% relief.  He noted that some of the worst pains are in the neck between this shoulder blades in the lower back.  When he sits for too long the pain does radiate down the right leg sometimes all the way to the foot.  Nighttime the pain is severe enough to keep him up, morning stiffness can last 2 to 3 hours.  He has not observed pain in other joint areas in any of the appendicular system.  He has not observed swelling.  He has not seen features suggestive of dactylitis.  He has no features suggestive of enthesitis of the peripheral joints.  He recalls no features suggestive of uveitis/iritis, personal or family history of psoriasis ulcerative colitis or Crohn's disease.  When he was on Enbrel he felt that there was a 90% improvement in his pain and stiffness.  He is not a smoker alcohol 1-2 times per week socially.  Likes to swim and walk regularly.  He has had some constipation and the GI symptoms, fatigue, generalized weakness, headaches and dizziness, hair loss.   Further historical information and ADL limitations as noted in the multidimensional health assessment questionnaire attached in the EMR.  ALLERGIES:Patient has no known allergies.    PAST MEDICAL/ACTIVE PROBLEMS/MEDICATION/ FAMILY HISTORY/SOCIAL DATA:  The patient has a family  "history of  No past medical history on file.  Social History     Tobacco Use   Smoking Status Never Smoker   Smokeless Tobacco Never Used     There is no problem list on file for this patient.    Current Outpatient Medications   Medication Sig Dispense Refill     acetaminophen (TYLENOL) 500 MG tablet Take 500 mg by mouth every 6 (six) hours as needed for pain.       naproxen (NAPROSYN) 500 MG tablet TAKE 1 TABLET BY MOUTH TWICE A DAY WITH MEALS 60 tablet 2     secukinumab (COSENTYX PEN) 150 mg/mL PnIj Inject 150 mg under the skin once a week for 5 doses. Then inject 150mg subcutaneously every 28 days 5 mL 3     secukinumab (COSENTYX) 150 mg/mL Syrg Inject 150 mg under the skin every 7 days for 5 days. And then 150 mg q. 4-week. 5 Syringe 1     sildenafil (REVATIO) 20 mg tablet Take 1-5 tablets on demand one hour prior to planned intercourse 30 tablet 6     No current facility-administered medications for this visit.      DETAILED EXAMINATION  01/06/20  :  Vitals:    01/06/20 1000   BP: 130/84   Patient Site: Right Arm   Patient Position: Sitting   Cuff Size: Adult Regular   Pulse: 78   Weight: 200 lb 9.6 oz (91 kg)   Height: 5' 10\" (1.778 m)     Alert oriented. Head including the face is examined for malar rash, heliotropes, scarring, lupus pernio. Eyes examined for redness such as in episcleritis/scleritis, periorbital lesions.   Neck/ Face examined for parotid gland swelling, range of motion of neck.  Left upper and lower and right upper and lower extremities examined for tenderness, swelling, warmth of the appendicular joints, range of motion, edema, rash.  Some of the important findings included: he does not have synovitis in the palpable joints of upper extremities, no digital dactylitis, occipital wall distance is 0.  Knees without effusion warmth or JLT.       LAB / IMAGING DATA:  ALT   Date Value Ref Range Status   09/23/2019 11 0 - 45 U/L Final   09/09/2019 13 0 - 45 U/L Final     Albumin   Date Value Ref " Range Status   09/23/2019 3.2 (L) 3.5 - 5.0 g/dL Final   09/09/2019 3.3 (L) 3.5 - 5.0 g/dL Final     Creatinine   Date Value Ref Range Status   09/23/2019 0.73 0.70 - 1.30 mg/dL Final   09/09/2019 0.73 0.70 - 1.30 mg/dL Final       WBC   Date Value Ref Range Status   09/09/2019 10.0 4.0 - 11.0 thou/uL Final     Hemoglobin   Date Value Ref Range Status   09/09/2019 13.4 (L) 14.0 - 18.0 g/dL Final     Platelets   Date Value Ref Range Status   09/09/2019 353 140 - 440 thou/uL Final       Lab Results   Component Value Date    SEDRATE 80 (H) 09/09/2019

## 2021-06-06 NOTE — TELEPHONE ENCOUNTER
RN cannot approve Refill Request    RN can NOT refill this medication med is not covered by policy/route to provider. Last office visit: 7/25/2019 Edgard Arriaga MD Last Physical: 9/23/2019 Last MTM visit: Visit date not found Last visit same specialty: 7/25/2019 Edgard Arriaga MD.  Next visit within 3 mo: Visit date not found  Next physical within 3 mo: Visit date not found      Jennifer Garcia, [facility] Triage/Med Refill 3/7/2020    Requested Prescriptions   Pending Prescriptions Disp Refills     naproxen (NAPROSYN) 500 MG tablet [Pharmacy Med Name: NAPROXEN 500 MG TABLET] 60 tablet 2     Sig: TAKE 1 TABLET BY MOUTH TWICE A DAY WITH MEALS       There is no refill protocol information for this order

## 2021-06-09 NOTE — PROGRESS NOTES
"Jesus Olivares is a 45 y.o. male who is being evaluated via a billable video visit.      The patient has been notified of following:     \"This video visit will be conducted via a call between you and your physician/provider. We have found that certain health care needs can be provided without the need for an in-person physical exam.  This service lets us provide the care you need with a video conversation.  If a prescription is necessary we can send it directly to your pharmacy.  If lab work is needed we can place an order for that and you can then stop by our lab to have the test done at a later time.    Video visits are billed at different rates depending on your insurance coverage. Please reach out to your insurance provider with any questions.    If during the course of the call the physician/provider feels a video visit is not appropriate, you will not be charged for this service.\"    Patient has given verbal consent to a Video visit? Yes  How would you like to obtain your AVS? AVS Preference: [email or web address].  Patient would like the video invitation sent by: Text to cell phone: [phone]  Will anyone else be joining your video visit? No            Video-Visit Details    Type of service:  Video Visit    Originating Location (pt. Location): Home    Distant Location (provider location):  [facility]     [facility] Visit[facility]        ASSESSMENT AND PLAN:    Diagnoses and all orders for this visit:    Ankylosing spondylitis of sacral region (H)    Lumbar spondylosis    History of bilateral hip arthroplasty          HISTORY OF PRESENTING ILLNESS:  Jesus Olivares 45 y.o. is evaluated here via video link.  However the video link did not work and we continue talk on the audio link.  Oximetry video.  He has noted worsening pain in his lower back.  This is associated stiffness in the morning lasting up to 2 hours.  This is in the neck and the lower back.  Hip areas.  He does not think " "Cosentyx is done as good for him.  He feels that Enbrel is better.  The reason for change originally was insurance related.  I have encouraged him to check with his insurance and see if he can be given 1 of the tumor necrosis factor inhibitor such as Enbrel or Humira.  He was under the impression that I have told him that he has \"destruction\" of his pelvis and with erectile dysfunction he wondered if ankylosing spondylitis might be a contributory factor there.  We reviewed the words carefully today like degenerative joint disease versus destructive joint disease and clarified that I would not want him to carry this impression that he has destroyed the pelvis.  He is going to check with urologist regarding erectile dysfunction.  He is on rewash-year-old.  We discussed the dose he takes up to 3 or 4 even 5 tablets sometimes.  He will let us know which biologic works the best for his insurance and that can be then changed.  We will meet here in 3 months or sooner.     ROS enquiry held for fever, ocular symptoms, rash, headache,  GI issues.  Today we also discussed the issues related to the current pandemic, the pros and cons of the current treatment plan, the CDC guidelines such as social distancing washing the hands covering the cough.  ALLERGIES:Patient has no known allergies.    PAST MEDICAL/ACTIVE PROBLEMS/MEDICATION/SOCIAL DATA  No past medical history on file.  Social History     Tobacco Use   Smoking Status Never Smoker   Smokeless Tobacco Never Used     Patient Active Problem List   Diagnosis     Ankylosing spondylitis of sacral region (H)     History of bilateral hip arthroplasty     Lumbar spondylosis     Current Outpatient Medications   Medication Sig Dispense Refill     acetaminophen (TYLENOL) 500 MG tablet Take 500 mg by mouth every 6 (six) hours as needed for pain.       naproxen (NAPROSYN) 500 MG tablet TAKE 1 TABLET BY MOUTH TWICE A DAY WITH MEALS 60 tablet 2     sildenafil (REVATIO) 20 mg tablet Take " 1-5 tablets on demand one hour prior to planned intercourse 30 tablet 6     secukinumab (COSENTYX) 150 mg/mL Syrg Inject 150 mg under the skin every 28 days. 150 mL 5     No current facility-administered medications for this visit.          EXAMINATION: He sounded comfortable, alert oriented speech was fluent.    LAB / IMAGING DATA:  ALT   Date Value Ref Range Status   07/06/2020 29 0 - 45 U/L Final   01/06/2020 26 0 - 45 U/L Final   09/23/2019 11 0 - 45 U/L Final     Albumin   Date Value Ref Range Status   07/06/2020 3.9 3.5 - 5.0 g/dL Final   01/06/2020 3.8 3.5 - 5.0 g/dL Final   09/23/2019 3.2 (L) 3.5 - 5.0 g/dL Final     Creatinine   Date Value Ref Range Status   07/06/2020 0.80 0.70 - 1.30 mg/dL Final   01/06/2020 0.79 0.70 - 1.30 mg/dL Final   09/23/2019 0.73 0.70 - 1.30 mg/dL Final       WBC   Date Value Ref Range Status   07/06/2020 7.0 4.0 - 11.0 thou/uL Final   01/06/2020 7.4 4.0 - 11.0 thou/uL Final     Hemoglobin   Date Value Ref Range Status   07/06/2020 16.1 14.0 - 18.0 g/dL Final   01/06/2020 16.1 14.0 - 18.0 g/dL Final   09/09/2019 13.4 (L) 14.0 - 18.0 g/dL Final     Platelets   Date Value Ref Range Status   07/06/2020 225 140 - 440 thou/uL Final   01/06/2020 238 140 - 440 thou/uL Final   09/09/2019 353 140 - 440 thou/uL Final       Lab Results   Component Value Date    SEDRATE 80 (H) 09/09/2019     Duration of the call:16  Minutes  Call start: 522  pm  Call end:   538pm

## 2021-06-15 NOTE — TELEPHONE ENCOUNTER
RN cannot approve Refill Request    RN can NOT refill this medication med is not covered by policy/route to provider. Last office visit: 7/25/2019 Edgard Arriaga MD Last Physical: 9/23/2019 Last MTM visit: Visit date not found Last visit same specialty: 7/25/2019 Edgard Arriaga MD.  Next visit within 3 mo: Visit date not found  Next physical within 3 mo: Visit date not found      Francine Warner, [facility] Triage/Med Refill 2/25/2021    Requested Prescriptions   Pending Prescriptions Disp Refills     naproxen (NAPROSYN) 500 MG tablet [Pharmacy Med Name: NAPROXEN 500 MG TABLET] 60 tablet 2     Sig: TAKE 1 TABLET BY MOUTH TWICE A DAY WITH MEALS       There is no refill protocol information for this order

## 2021-06-16 NOTE — TELEPHONE ENCOUNTER
Telephone Encounter by Ros Bray at 9/24/2019  8:30 AM     Author: Ros Bray Service: -- Author Type: Financial Resource Guide    Filed: 9/27/2019  7:28 AM Encounter Date: 9/24/2019 Status: Addendum    : Ros Bray (Financial Resource Guide)    Related Notes: Original Note by Ros Bray (Financial Resource Guide) filed at 9/24/2019  8:34 AM       Medication: Cosentyx 150mg/ml  QTY: 4 pens for 28 days (loading dose) and then 1 pen for 28 days (maintenance dose)  Insurance: [employer]  Additional Information: waiting for TB results - negative (9/27/2019)

## 2021-06-16 NOTE — TELEPHONE ENCOUNTER
Telephone Encounter by Ros Bray at 9/27/2019  7:29 AM     Author: Ros Bray Service: -- Author Type: Financial Resource Guide    Filed: 9/27/2019  7:29 AM Encounter Date: 9/24/2019 Status: Signed    : Ros Bray (Financial Resource Guide)

## 2021-06-16 NOTE — TELEPHONE ENCOUNTER
Telephone Encounter by Ros Bray at 9/25/2019  7:51 AM     Author: Ros Bray Service: -- Author Type: Financial Resource Guide    Filed: 9/25/2019  9:44 AM Encounter Date: 9/24/2019 Status: Addendum    : Ros Bray (Financial Resource Guide)    Related Notes: Original Note by Ros Bray (Financial Resource Guide) filed at 9/25/2019  7:55 AM       Medication: Cosentyx 150mg/ml  Qty: 4 pens for 28 days (loading dose) and then 1 pen for 28 days (maintenance dose)  Effective Dates: 09/24/2019 - 09/23/2020  Pharmacy: [facility]  Copay Amount: $1548.40  Copay Assistance: Enrolled in Cosentyx Connect  Patient Notified? Yes, Pharmacy to notify

## 2021-06-16 NOTE — TELEPHONE ENCOUNTER
----- Message from Mariah Camargo RN sent at 4/12/2021  1:38 PM CDT -----  Please call pt to schedule lab only appt and follow up with Dr Cobos for med refills

## 2021-06-16 NOTE — TELEPHONE ENCOUNTER
[facility] called with 2nd refill request for Cosentyx.  They would like to ship meds this morning.  Phone [phone]

## 2021-06-16 NOTE — TELEPHONE ENCOUNTER
Telephone Encounter by Ros Bray at 1/7/2020  3:25 PM     Author: Ros Bray Service: -- Author Type: Financial Resource Guide    Filed: 1/7/2020  3:27 PM Encounter Date: 1/7/2020 Status: Signed    : Ros Bray (Financial Resource Guide)       Medication: Cosentyx 150mg/ml  Qty: 1 pen for 28 days  Insurance: [employer]  Test Claim: $1391.48  Copay Assistance: patient has copay card and  debit card on file at the pharmacy  Pharmacy: [facility]  Additional Information: NA

## 2021-06-16 NOTE — TELEPHONE ENCOUNTER
Patient is calling because  Specialty Pharmacy is telling him that they do not have the rx for Cosentyx. Please resend.

## 2021-06-17 NOTE — TELEPHONE ENCOUNTER
Telephone Encounter by Ros Bray at 5/12/2021 12:42 PM     Author: Ros Bray Service: -- Author Type: Financial Resource Guide    Filed: 5/12/2021 12:55 PM Encounter Date: 5/12/2021 Status: Signed    : Ros Bray (Financial Resource Guide)       PA Initiation  Medication: Enbrel Sureclick 50mg/ml  QTY: 4 pens for 28 days  Insurance Company: [employer]  Pharmacy Filing Rx: [facility]   Filling Pharmacy Phone:   Filling Pharmacy Fax: na  Start Date: 5/12/21  Additional Information: switching therapy from cosentyx to enbrel

## 2021-06-17 NOTE — TELEPHONE ENCOUNTER
Telephone Encounter by Ros Bray at 5/13/2021  7:34 AM     Author: Ros Bray Service: -- Author Type: Financial Resource Guide    Filed: 5/13/2021 10:10 AM Encounter Date: 5/12/2021 Status: Signed    : Ros Bray (Financial Resource Guide)       Prior Authorization Approval  Medication: Enbrel sureclick 50mg/ml  Qty: 4 pens for 28 days  Effective Dates: 5/12/21 - 5/12/22  Reference Number: [id number]  Insurance Company: [employer]  Pharmacy: [facility]   Expected Copay: $190.01  Copay Card Available: Yes  Foundation Assistance Needed/Available: No  Patient Assistance Program Needed: No  Patient Notified? Yes  Pharmacy Notified? Yes

## 2021-06-17 NOTE — TELEPHONE ENCOUNTER
Telephone Encounter by Ros Bray at 10/2/2020 11:29 AM     Author: Ros Bray Service: -- Author Type: Financial Resource Guide    Filed: 10/2/2020 11:34 AM Encounter Date: 10/2/2020 Status: Signed    : Ros Bray (Financial Resource Guide)       PA Initiation  Medication: Cosentyx 150mg/ml  QTY: 1 pen for 28 days  Insurance Company: [employer]  Pharmacy Filing Rx: [facility]   Filling Pharmacy Phone: [phone]  Filling Pharmacy Fax: NA  Start Date: 10/02/2020  Additional Information: NA

## 2021-06-17 NOTE — TELEPHONE ENCOUNTER
Telephone Encounter by Ros Bray at 10/6/2020  8:11 AM     Author: Ros Bray Service: -- Author Type: Financial Resource Guide    Filed: 10/6/2020  8:24 AM Encounter Date: 10/2/2020 Status: Signed    : Ros Bray (Financial Resource Guide)       Received fax for additional information that I filled out and faxed back into [employer] at [phone].

## 2021-06-17 NOTE — TELEPHONE ENCOUNTER
Telephone Encounter by Ros Bray at 10/6/2020  8:58 AM     Author: Rso Bray Service: -- Author Type: Financial Resource Guide    Filed: 10/6/2020  9:27 AM Encounter Date: 10/2/2020 Status: Signed    : Ros Bray (Financial Resource Guide)       Prior Authorization Approval  Medication: Cosentyx 150mg/ml  Qty: 1 pen for 28 days  Effective Dates: 10/06/2020 -10/06/2021  Reference Number: [id number]  Insurance Company: [employer]   Pharmacy: [facility]  Expected Copay: $$0  Copay Card Available: already enrolled and on file  Foundation Assistance Needed/Available: Not needed  Patient Assistance Program Needed: not  neede  Patient Notified? Yes, pharmacy to [facility]  Pharmacy Notified? Yes

## 2021-06-17 NOTE — TELEPHONE ENCOUNTER
Telephone Encounter by Ros Bray at 5/13/2021  8:17 AM     Author: Ros Bray Service: -- Author Type: Financial Resource Guide    Filed: 5/13/2021 10:10 AM Encounter Date: 5/12/2021 Status: Signed    : Ros Bray (Financial Resource Guide)       Patient will need to call [phone] to enroll in copay assistance and get the Enbrel Debit Card since insurance has the accumulator policy. I cannot enroll patient any longer on "[employer]'s website.

## 2021-06-17 NOTE — TELEPHONE ENCOUNTER
LMPTCB- to go over PA approval, high copay, that he needs to call Enbrel Support to enroll in copay assistance and rx will be filled at [facility].

## 2021-06-17 NOTE — PROGRESS NOTES
Jesus Olivares is a 46 y.o. male who is being evaluated via a billable video visit.      How would you like to obtain your AVS? Mail a copy.  If dropped from the video visit, the video invitation should be resent by: Send to e-mail at: [email or web address]  Will anyone else be joining your video visit? No      Video Start Time: 5:46 PM  Video-Visit Details    Type of service:  Video Visit    Video End Time (time video stopped): 5:57 PM  Originating Location (pt. Location): Home    Distant Location (provider location):  [facility]     Platform used for Video Visit: 3Scan    This document was created using a software with less than 100% fidelity, at times resulting in unintended, even erroneous syntax and grammar.  The reader is advised to keep this under consideration while reviewing, interpreting this note.           ASSESSMENT AND PLAN:    Diagnoses and all orders for this visit:    Ankylosing spondylitis of sacral region (H)  -     etanercept 50 mg/mL (1 mL) PnIj; Inject 50 mg under the skin every 7 days.  Dispense: 4 Syringe; Refill: 4  -     Ambulatory referral to Orthopedics    Lumbar spondylosis    History of bilateral hip arthroplasty  -     Ambulatory referral to Orthopedics        Follow up in 3 months      HISTORY OF PRESENTING ILLNESS:  Jesus Olivares 46 y.o. is evaluated here via video/audio link.     However the video link did not work and we continue talk on the audio link.  He reported ongoing pain in the low back for overnight, first thing in the morning, 2 hours of stiffness, no radiation down the leg.  He is also noted pain in the trochanteric region.  Sometimes keeps her up at night.  There is no history of recent trauma.  He is still on Cosentyx.  We discussed changing to tumor necrosis factor inhibitors.  In the past he has had much better response with Enbrel then with Humira.  He would like to go back on that.  Pros and cons were reviewed.  With regards to hip  area pain he means trochanteric region.  This could be trochanteric bursitis and other reasons.  He would like referral for orthopedics, it has been more than a decade that he had bilateral hip arthroplasty.   ROS enquiry held for fever, ocular symptoms, rash, headache,  GI issues.  Today we also discussed the issues related to the current pandemic, the pros and cons of the current treatment plan, the CDC guidelines such as social distancing washing the hands covering the cough.  ALLERGIES:Patient has no known allergies.    PAST MEDICAL/ACTIVE PROBLEMS/MEDICATION/SOCIAL DATA  No past medical history on file.  Social History     Tobacco Use   Smoking Status Never Smoker   Smokeless Tobacco Never Used     Patient Active Problem List   Diagnosis     Ankylosing spondylitis of sacral region (H)     History of bilateral hip arthroplasty     Lumbar spondylosis     Current Outpatient Medications   Medication Sig Dispense Refill     acetaminophen (TYLENOL) 500 MG tablet Take 500 mg by mouth every 6 (six) hours as needed for pain.       COSENTYX  mg/mL PnIj INJECT THE CONTENTS OF ONE PEN UNDER THE SKIN EVERY 4 WEEKS 1 mL 0     naproxen (NAPROSYN) 500 MG tablet TAKE 1 TABLET BY MOUTH TWICE A DAY WITH MEALS 60 tablet 2     sildenafil (REVATIO) 20 mg tablet Take 1-5 tablets on demand one hour prior to planned intercourse 30 tablet 6     No current facility-administered medications for this visit.          EXAMINATION:     On the phone sounded comfortable, alert, oriented, speech was fluent,  memory recall appeared normal, did not sound like was in pain or short of breath.      LAB / IMAGING DATA:  ALT   Date Value Ref Range Status   04/21/2021 27 0 - 45 U/L Final   07/06/2020 29 0 - 45 U/L Final   01/06/2020 26 0 - 45 U/L Final     Albumin   Date Value Ref Range Status   04/21/2021 4.0 3.5 - 5.0 g/dL Final   07/06/2020 3.9 3.5 - 5.0 g/dL Final   01/06/2020 3.8 3.5 - 5.0 g/dL Final     Creatinine   Date Value Ref Range  Status   04/21/2021 0.80 0.70 - 1.30 mg/dL Final   07/06/2020 0.80 0.70 - 1.30 mg/dL Final   01/06/2020 0.79 0.70 - 1.30 mg/dL Final       WBC   Date Value Ref Range Status   04/21/2021 7.2 4.0 - 11.0 thou/uL Final   07/06/2020 7.0 4.0 - 11.0 thou/uL Final     Hemoglobin   Date Value Ref Range Status   04/21/2021 15.9 14.0 - 18.0 g/dL Final   07/06/2020 16.1 14.0 - 18.0 g/dL Final   01/06/2020 16.1 14.0 - 18.0 g/dL Final     Platelets   Date Value Ref Range Status   04/21/2021 225 140 - 440 thou/uL Final   07/06/2020 225 140 - 440 thou/uL Final   01/06/2020 238 140 - 440 thou/uL Final       Lab Results   Component Value Date    SEDRATE 80 (H) 09/09/2019

## 2021-06-17 NOTE — TELEPHONE ENCOUNTER
PT called back and I informed of info below. He will call to get enrolled in the enbrel debit card and he knows that he will need to provide that card info to [facility].

## 2021-06-19 NOTE — LETTER
Letter by Edgard Arriaga MD at      Author: Edgard Arriaga MD Service: -- Author Type: --    Filed:  Encounter Date: 9/24/2019 Status: Signed         Jesus Olivares  [facility] [id number]             September 24, 2019         Dear Mr. Olivares,    Below are the results from your recent visit:    Resulted Orders   Comprehensive Metabolic Panel   Result Value Ref Range    Sodium 140 136 - 145 mmol/L    Potassium 4.1 3.5 - 5.0 mmol/L    Chloride 106 98 - 107 mmol/L    CO2 24 22 - 31 mmol/L    Anion Gap, Calculation 10 5 - 18 mmol/L    Glucose 96 70 - 125 mg/dL    BUN 18 8 - 22 mg/dL    Creatinine 0.73 0.70 - 1.30 mg/dL    GFR MDRD Af Amer >60 >60 mL/min/1.73m2    GFR MDRD Non Af Amer >60 >60 mL/min/1.73m2    Bilirubin, Total 0.2 0.0 - 1.0 mg/dL    Calcium 9.0 8.5 - 10.5 mg/dL    Protein, Total 7.3 6.0 - 8.0 g/dL    Albumin 3.2 (L) 3.5 - 5.0 g/dL    Alkaline Phosphatase 78 45 - 120 U/L    AST 13 0 - 40 U/L    ALT 11 0 - 45 U/L    Narrative    Fasting Glucose reference range is 70-99 mg/dL per  American Diabetes Association (ADA) guidelines.   Glycosylated Hemoglobin A1c   Result Value Ref Range    Hemoglobin A1c 6.1 (H) 3.5 - 6.0 %   Urinalysis-UC if Indicated   Result Value Ref Range    Color, UA Yellow Colorless, Yellow, Straw, Light Yellow    Clarity, UA Clear Clear    Glucose, UA Negative Negative    Bilirubin, UA Negative Negative    Ketones, UA Negative Negative    Specific Gravity, UA >=1.030 1.005 - 1.030    Blood, UA Small (!) Negative    pH, UA 5.0 5.0 - 8.0    Protein, UA 30 mg/dL (!) Negative mg/dL    Urobilinogen, UA 0.2 E.U./dL 0.2 E.U./dL, 1.0 E.U./dL    Nitrite, UA Negative Negative    Leukocytes, UA Negative Negative    Bacteria, UA Few (!) None Seen hpf    RBC, UA 3-5 (!) None Seen, 0-2 hpf    WBC, UA 0-5 None Seen, 0-5 hpf    Squam Epithel, UA 0-5 None Seen, 0-5 lpf    Mucus, UA Moderate (!) None Seen lpf    Sperm, UA Present (!) None Seen    Narrative    UC not indicated        Suggest losing some weight to get A!C down a little but rest of tests look good    Please call with questions or contact us using [email or web address].    Sincerely,        Electronically signed by Edgard Arriaga MD

## 2021-10-24 ENCOUNTER — HEALTH MAINTENANCE LETTER (OUTPATIENT)
Age: 46
End: 2021-10-24

## 2021-10-25 DIAGNOSIS — M45.5 ANKYLOSING SPONDYLITIS OF THORACOLUMBAR REGION (H): ICD-10-CM

## 2021-10-25 DIAGNOSIS — Z00.00 ENCOUNTER FOR GENERAL ADULT MEDICAL EXAMINATION WITHOUT ABNORMAL FINDINGS: ICD-10-CM

## 2021-10-26 NOTE — TELEPHONE ENCOUNTER
Disp Refills Start End JOSE    naproxen (NAPROSYN) 500 MG tablet 60 tablet 2 3/2/2021  No   Sig: TAKE 1 TABLET BY MOUTH TWICE A DAY WITH MEALS   Sent to pharmacy as: naproxen 500 mg tablet (NAPROSYN)   Notes to Pharmacy: DX Code Needed  PATIENT IS OUT OF REFILLS PLEASE SEND US A NEW PRESCRIPTION SO WE CAN FILL IT PLEASE THANK YOU..   E-Prescribing Status: Receipt confirmed by pharmacy (3/2/2021 12:52 PM CST)       naproxen (NAPROSYN) 500 MG tablet [[id number]]    Electronically signed by: Edgard Arriaga MD on 03/02/21 1252 Status: Active   Ordering user: Edgard Arriaga MD 03/02/21 1252 Authorized by: Edgard Arriaga MD   Frequency:  03/02/21 - Until Discontinued Released by: Edgard Arriaga MD 03/02/21 1252   Diagnoses  Ankylosing spondylitis of thoracolumbar region (H) [M45.5]  Routine general medical examination at a health care facility [Z00.00]   Medication comments: DX Code Needed  PATIENT IS OUT OF REFILLS PLEASE SEND US A NEW PRESCRIPTION SO WE CAN FILL IT PLEASE THANK YOU..     Routing refill request to provider for review/approval because:  Labs not current:  multiple  Patient needs to be seen because it has been more than 1 year since last office visit.    Last office visit provider:  9/23/19     Requested Prescriptions   Pending Prescriptions Disp Refills     naproxen (NAPROSYN) 500 MG tablet [Pharmacy Med Name: NAPROXEN 500 MG TABLET] 60 tablet 2     Sig: TAKE 1 TABLET BY MOUTH TWICE A DAY WITH MEALS       NSAID Medications Failed - 10/25/2021  8:22 AM        Failed - Blood pressure under 140/90 in past 12 months     BP Readings from Last 3 Encounters:   01/06/20 130/84   09/23/19 122/70   09/23/19 112/68                 Failed - Normal AST on file in past 12 months     Recent Labs   Lab Test 09/23/19  0825   AST 13             Failed - Recent (12 mo) or future (30 days) visit within the authorizing provider's specialty     Patient has had an office visit with the authorizing provider or a provider  "within the authorizing providers department within the previous 12 mos or has a future within next 30 days. See \"Patient Info\" tab in inbasket, or \"Choose Columns\" in Meds & Orders section of the refill encounter.              Failed - Medication is active on med list        Passed - Normal ALT on file in past 12 months     Recent Labs   Lab Test 04/21/21  0709   ALT 27             Passed - Patient is age 6-64 years        Passed - Normal CBC on file in past 12 months     Recent Labs   Lab Test 04/21/21  0709   WBC 7.2   RBC 5.31   HGB 15.9   HCT 47.2                    Passed - Normal serum creatinine on file in past 12 months     Recent Labs   Lab Test 04/21/21  0709   CR 0.80       Ok to refill medication if creatinine is low               Cristal Bergeron RN 10/26/21 12:31 PM  "

## 2021-10-27 RX ORDER — NAPROXEN 500 MG/1
TABLET ORAL
Qty: 60 TABLET | Refills: 2 | Status: SHIPPED | OUTPATIENT
Start: 2021-10-27 | End: 2022-03-31

## 2022-01-16 ENCOUNTER — HOSPITAL ENCOUNTER (EMERGENCY)
Facility: CLINIC | Age: 47
Discharge: HOME | End: 2022-01-16
Attending: STUDENT IN AN ORGANIZED HEALTH CARE EDUCATION/TRAINING PROGRAM | Admitting: STUDENT IN AN ORGANIZED HEALTH CARE EDUCATION/TRAINING PROGRAM
Payer: COMMERCIAL

## 2022-01-16 VITALS
HEIGHT: 71 IN | WEIGHT: 193 LBS | TEMPERATURE: 97.6 F | RESPIRATION RATE: 16 BRPM | BODY MASS INDEX: 27.02 KG/M2 | HEART RATE: 77 BPM | OXYGEN SATURATION: 97 % | DIASTOLIC BLOOD PRESSURE: 99 MMHG | SYSTOLIC BLOOD PRESSURE: 158 MMHG

## 2022-01-16 DIAGNOSIS — H10.33 ACUTE CONJUNCTIVITIS OF BOTH EYES, UNSPECIFIED ACUTE CONJUNCTIVITIS TYPE: ICD-10-CM

## 2022-01-16 PROBLEM — M47.816 LUMBAR SPONDYLOSIS: Status: ACTIVE | Noted: 2020-01-06

## 2022-01-16 PROBLEM — Z98.890 HISTORY OF REPAIR OF HIP JOINT: Status: ACTIVE | Noted: 2020-01-06

## 2022-01-16 PROBLEM — K64.9 HEMORRHOIDS: Status: ACTIVE | Noted: 2019-06-17

## 2022-01-16 LAB
FLUAV RNA SPEC QL NAA+PROBE: NEGATIVE
FLUBV RNA RESP QL NAA+PROBE: NEGATIVE
SARS-COV-2 RNA RESP QL NAA+PROBE: NEGATIVE

## 2022-01-16 PROCEDURE — C9803 HOPD COVID-19 SPEC COLLECT: HCPCS

## 2022-01-16 PROCEDURE — 99283 EMERGENCY DEPT VISIT LOW MDM: CPT

## 2022-01-16 PROCEDURE — 87636 SARSCOV2 & INF A&B AMP PRB: CPT | Performed by: STUDENT IN AN ORGANIZED HEALTH CARE EDUCATION/TRAINING PROGRAM

## 2022-01-16 RX ORDER — CIPROFLOXACIN HYDROCHLORIDE 3.5 MG/ML
1-2 SOLUTION/ DROPS TOPICAL EVERY 4 HOURS
Qty: 4.2 ML | Refills: 0 | Status: SHIPPED | OUTPATIENT
Start: 2022-01-16 | End: 2022-01-23

## 2022-01-16 ASSESSMENT — MIFFLIN-ST. JEOR: SCORE: 1772.57

## 2022-01-16 NOTE — ED TRIAGE NOTES
The patient presents to the ED with bilateral eye redness, yellow drainage and itching/burning. The patient reports symptoms began Friday and have been worsening.

## 2022-01-16 NOTE — ED PROVIDER NOTES
Emergency Department Encounter         FINAL IMPRESSION:  Conjunctivitis, viral syndrome        ED COURSE AND MEDICAL DECISION MAKING     9:04 AM I met with the patient, obtained history, performed an initial exam, and discussed options and plan for diagnostics and treatment here in the ED.       ED Course as of 01/16/22 0917   Sun Jan 16, 2022   0915 Patient is a 46-year-old male with a history of RA on Enbrel, here from home with 2 weeks of feeling unwell including cough, congestion, sinus pain and pressure, sore throat and ear fullness.  2 days now is noticed bilateral eye drainage and crusting.  Mild blurry vision at times.  No fevers.  No vomiting or chest pain.  No abdominal pain.  On arrival his vitals are stable.  He looks well clinically.  Has bilateral conjunctival injection.  Is equal round reactive pupils bilaterally.  No signs of orbital or periorbital cellulitis.  Does have some mild periorbital edema.  Throat is hyperemic with no significant exudates or asymmetry or signs of bacterial infection.  TMs are clear bilaterally.  He has no significant lymphadenopathy.  No drooling stridor or trismus.  Lungs are clear.  Plan for ciprofloxacin eyedrops, outpatient follow-up with ophthalmology in 48 hours if symptoms are not improving as well as COVID swab.       - COVID pending.  Patient will follow-up on this on his MyChart.  Sent over Ciprodex.  No signs of periorbital or orbital cellulitis.             At the conclusion of the encounter I discussed the results of all the tests and the disposition. The questions were answered. The patient or family acknowledged understanding and was agreeable with the care plan.                  MEDICATIONS GIVEN IN THE EMERGENCY DEPARTMENT:  Medications - No data to display    NEW PRESCRIPTIONS STARTED AT TODAY'S ED VISIT:  New Prescriptions    No medications on file       HPI     Patient information obtained from: patient     Use of Interpretor: N/A     Jesus STRONG  Marshall is a 46 year old male with a pertinent history of hypertension, ankylosing spondylitis, and asthma who presents to this ED by walk in for evaluation of conjunctivitis.     On 1/14/2022 (2 days ago), patient noticed bilateral conjunctivitis which got progressively worse. Patient endorses eyes are red, tearful, sore, and slightly painful with a sticky, greenish discharge. Patient's eyelids are also swollen and vision is slightly blurry. Notes that vision was worst on 1/15/2022 but got better today. Patient also endorses associated symptom of a low grade fever and ear pressure. He used warm water to clean his eyes and eyedrops.     Patient notes that 2 weeks ago, he started feeling a sore throat, sinus pain, fatigue, and trouble sleeping. Patient denies cough.     Patient denies any sick contacts. Denies wearing contact lenses. Endorses that he takes Enbral and naproxen.     Patient denies vomiting, diarrhea, swollen lymph nodes, or any other complaints at this time.       REVIEW OF SYSTEMS:  Review of Systems   Constitutional: Positive for low grade fever, fatigue, and trouble sleeping.   HEENT: Positive for eye soreness, redness, and pain, eye discharge (sticky, green), swollen eyelids, blurry vision, sore throat, sinus pain, and ear pressure. Negative runny nose, ear pain, neck pain,   Respiratory: Negative for shortness of breath, cough, congestion  Cardiovascular: Negative for chest pain, leg edema  Gastrointestinal: Negative for abdominal distention, abdominal pain, constipation, vomiting, nausea, diarrhea  Genitourinary: Negative for dysuria and hematuria.   Integument: Negative for rash, skin breakdown  Neurological: Negative for paresthesias, weakness, headache.  Musculoskeletal: Negative for joint pain, joint swelling, swollen lymph nodes.      All other systems reviewed and are negative.          MEDICAL HISTORY     Past Medical History:   Diagnosis Date     Hypertension        No past surgical  "history on file.    Social History     Tobacco Use     Smoking status: Never Smoker     Smokeless tobacco: Never Used   Substance Use Topics     Alcohol use: Yes     Drug use: No       ACETAMINOPHEN 500 MG OR TABS  Carboxymethylcellul-Glycerin (CLEAR EYES FOR DRY EYES OP)  celecoxib (CELEBREX) 200 MG capsule  ENBREL SURECLICK 50 MG/ML autoinjector  etanercept (ENBREL) 50 MG/ML injection  FISH OIL  LIDODERM 5 % EX PTCH  naproxen (NAPROSYN) 500 MG tablet  sulfaSALAzine (AZULFIDINE) 500 MG tablet            PHYSICAL EXAM     BP (!) 158/99   Pulse 77   Temp 97.6  F (36.4  C) (Temporal)   Resp 16   Ht 1.803 m (5' 11\")   Wt 87.5 kg (193 lb)   SpO2 97%   BMI 26.92 kg/m        PHYSICAL EXAM:     General: Patient appears well, nontoxic, comfortable  HEENT: Moist mucous membranes, no tongue swelling.  No head trauma.  No midline neck pain. PERRL. No signs of orbital or periorbital cellulitis. Mild periorbital edema. Throat is hyperemic with no significant exudate or asymmetry or signs of bacterial infection. No significant lymphadenopathy. No drooling, stridor, or trismus.  Bilateral scleral injection as well as conjunctival irritation.  No purulence.  No hyphema.  No hypopyon.  Full range of motion of the eyes with no pain with range of motion of the eyes.  Cardiovascular: Normal rate, normal rhythm, no extremity edema.  No appreciable murmur.  Respiratory: No signs of respiratory distress, lungs are clear to auscultation bilaterally with no wheezes rhonchi or rales.  Abdominal: Soft, nontender, nondistended, no palpable masses, no guarding, no rebound  Musculoskeletal: Full range of motion of joints, no deformities appreciated.  Neurological: Alert and oriented, grossly neurologically intact.  Psychological: Normal affect and mood.  Integument: No rashes appreciated          RESULTS       Labs Ordered and Resulted from Time of ED Arrival to Time of ED Departure - No data to display    No orders to display "                     PROCEDURES:  Procedures:  Procedures       I, Catarina Og, am serving as a scribe to document services personally performed by Familia Ordaz DO, based on my observations and the provider's statements to me.  I, Familia Ordaz DO, attest that Catarina Og is acting in a scribe capacity, has observed my performance of the services and has documented them in accordance with my direction.    Familia Ordaz DO  Emergency Medicine  [facility] EMERGENCY ROOM     Familia Ordaz DO  01/16/22 1038

## 2022-01-24 ENCOUNTER — TELEPHONE (OUTPATIENT)
Dept: RHEUMATOLOGY | Facility: CLINIC | Age: 47
End: 2022-01-24
Payer: COMMERCIAL

## 2022-01-24 DIAGNOSIS — M45.8 ANKYLOSING SPONDYLITIS OF SACRAL REGION (H): ICD-10-CM

## 2022-01-26 RX ORDER — MEDROXYPROGESTERONE ACETATE 150 MG/ML
INJECTION, SUSPENSION INTRAMUSCULAR
Qty: 4 ML | Refills: 0 | Status: SHIPPED | OUTPATIENT
Start: 2022-01-26 | End: 2022-02-10

## 2022-02-10 ENCOUNTER — LAB (OUTPATIENT)
Dept: LAB | Facility: CLINIC | Age: 47
End: 2022-02-10

## 2022-02-10 ENCOUNTER — OFFICE VISIT (OUTPATIENT)
Dept: RHEUMATOLOGY | Facility: CLINIC | Age: 47
End: 2022-02-10
Payer: COMMERCIAL

## 2022-02-10 VITALS
SYSTOLIC BLOOD PRESSURE: 112 MMHG | BODY MASS INDEX: 28.03 KG/M2 | HEART RATE: 72 BPM | HEIGHT: 71 IN | WEIGHT: 200.2 LBS | DIASTOLIC BLOOD PRESSURE: 84 MMHG

## 2022-02-10 DIAGNOSIS — Z96.643 HISTORY OF BILATERAL HIP ARTHROPLASTY: ICD-10-CM

## 2022-02-10 DIAGNOSIS — M45.8 ANKYLOSING SPONDYLITIS OF SACRAL REGION (H): Primary | ICD-10-CM

## 2022-02-10 DIAGNOSIS — Z79.899 HIGH RISK MEDICATION USE: ICD-10-CM

## 2022-02-10 DIAGNOSIS — M47.816 LUMBAR SPONDYLOSIS: ICD-10-CM

## 2022-02-10 LAB
ALBUMIN SERPL-MCNC: 3.8 G/DL (ref 3.5–5)
ALT SERPL W P-5'-P-CCNC: 29 U/L (ref 0–45)
CREAT SERPL-MCNC: 0.8 MG/DL (ref 0.7–1.3)
ERYTHROCYTE [DISTWIDTH] IN BLOOD BY AUTOMATED COUNT: 11.4 % (ref 10–15)
GFR SERPL CREATININE-BSD FRML MDRD: >90 ML/MIN/1.73M2
HCT VFR BLD AUTO: 45.8 % (ref 40–53)
HGB BLD-MCNC: 15.4 G/DL (ref 13.3–17.7)
MCH RBC QN AUTO: 30.4 PG (ref 26.5–33)
MCHC RBC AUTO-ENTMCNC: 33.6 G/DL (ref 31.5–36.5)
MCV RBC AUTO: 91 FL (ref 78–100)
PLATELET # BLD AUTO: 209 10E3/UL (ref 150–450)
RBC # BLD AUTO: 5.06 10E6/UL (ref 4.4–5.9)
WBC # BLD AUTO: 6.6 10E3/UL (ref 4–11)

## 2022-02-10 PROCEDURE — 82040 ASSAY OF SERUM ALBUMIN: CPT

## 2022-02-10 PROCEDURE — 82565 ASSAY OF CREATININE: CPT

## 2022-02-10 PROCEDURE — 36415 COLL VENOUS BLD VENIPUNCTURE: CPT

## 2022-02-10 PROCEDURE — 84460 ALANINE AMINO (ALT) (SGPT): CPT

## 2022-02-10 PROCEDURE — 99214 OFFICE O/P EST MOD 30 MIN: CPT | Performed by: INTERNAL MEDICINE

## 2022-02-10 PROCEDURE — 85027 COMPLETE CBC AUTOMATED: CPT

## 2022-02-10 RX ORDER — MEDROXYPROGESTERONE ACETATE 150 MG/ML
INJECTION, SUSPENSION INTRAMUSCULAR
Qty: 4 ML | Refills: 5 | Status: SHIPPED | OUTPATIENT
Start: 2022-02-10 | End: 2022-08-26

## 2022-02-10 ASSESSMENT — MIFFLIN-ST. JEOR: SCORE: 1805.23

## 2022-02-10 NOTE — PROGRESS NOTES
"      Rheumatology follow-up visit note     Jesus is a 46 year old male presents today for follow-up.    Jesus was seen today for recheck.    Diagnoses and all orders for this visit:    Ankylosing spondylitis of sacral region (H)  -     etanercept (ENBREL SURECLICK) 50 MG/ML autoinjector; INJECT THE CONTENTS OF ONE PEN UNDER THE SKIN EVERY SEVEN DAYS    Lumbar spondylosis    History of bilateral hip arthroplasty    High risk medication use  -     Albumin level; Standing  -     ALT; Standing  -     CBC with platelets; Standing  -     Creatinine; Standing        This patient's ankylosing spondylitis symptoms are better with Enbrel, since he switched from Cosentyx.  He continues to have what he describes as \"hip pain\" pointing to the lateral trochanteric area.  He is status post bilateral hip arthroplasty several years ago.  He is planning to go see the orthopedics in the near future.  We will meet here in 6 months labs today continue Enbrel as now.    Follow up in 6 months.    HPI    Jesus Olivares is a 46 year old male is here for follow-up. He reported ongoing pain in the low back for overnight, first thing in the morning, 2 hours of stiffness, no radiation down the leg.  He is also noted pain in the trochanteric region.  Sometimes keeps her up at night.  There is no history of recent trauma.  He is still on Cosentyx.  We discussed changing to tumor necrosis factor inhibitors.  In the past he has had much better response with Enbrel then with Humira.  He would like to go back on that.  Pros and cons were reviewed.  With regards to hip area pain he means trochanteric region.  This could be trochanteric bursitis and other reasons.  He would like referral for orthopedics, it has been more than a decade that he had bilateral hip arthroplasty.        DETAILED EXAMINATION  02/10/22  :    Vitals:    02/10/22 0905   BP: 112/84   BP Location: Right arm   Patient Position: Sitting   Cuff Size: Adult Regular   Pulse: " "72   Weight: 90.8 kg (200 lb 3.2 oz)   Height: 1.803 m (5' 11\")     Alert oriented. Head including the face is examined for malar rash, heliotropes, scarring, lupus pernio. Eyes examined for redness such as in episcleritis/scleritis, periorbital lesions.   Neck/ Face examined for parotid gland swelling, range of motion of neck.  Left upper and lower and right upper and lower extremities examined for tenderness, swelling, warmth of the appendicular joints, range of motion, edema, rash.  Some of the important findings included: he does not have evidence of synovitis in the palpable joints of the upper extremities.  No significant deformities of the digits.  no Heberden nodes.  Range of motion of the shoulders  show full abduction.  No JLT effusion or warmth of the knees.  he does not have dactylitis of the digits.     Patient Active Problem List    Diagnosis Date Noted     History of repair of hip joint 01/06/2020     Priority: Medium     Lumbar spondylosis 01/06/2020     Priority: Medium     Hemorrhoids 06/17/2019     Priority: Medium     Formatting of this note might be different from the original.  Added automatically from request for surgery [id number]       Chest pain, atypical 09/09/2014     Priority: Medium     Encounter for long-term current use of medication 01/09/2014     Priority: Medium     Problem list name updated by automated process. Provider to review       Allergic rhinitis 09/01/2013     Priority: Medium     Mild intermittent asthma 09/01/2013     Priority: Medium     Ankylosing spondylitis (H) 01/15/2008     Priority: Medium     No past surgical history on file.   Past Medical History:   Diagnosis Date     Hypertension      No Known Allergies  Current Outpatient Medications   Medication Sig Dispense Refill     ACETAMINOPHEN 500 MG OR TABS 2 tablets as needed       Carboxymethylcellul-Glycerin (CLEAR EYES FOR DRY EYES OP) Apply 1 drop to eye daily as needed        ENBREL SURECLICK 50 MG/ML autoinjector " INJECT THE CONTENTS OF ONE PEN UNDER THE SKIN EVERY SEVEN DAYS 4 mL 0     etanercept (ENBREL) 50 MG/ML injection Inject 0.98 mLs (50 mg) Subcutaneous once a week 1 vial 11     FISH OIL 1200 mg twice daily       LIDODERM 5 % EX PTCH Apply 1-3 daily as neede to painful areas 90 0     naproxen (NAPROSYN) 500 MG tablet TAKE 1 TABLET BY MOUTH TWICE A DAY WITH MEALS 60 tablet 2     celecoxib (CELEBREX) 200 MG capsule Take 1 capsule (200 mg) by mouth 2 times daily (Patient not taking: Reported on 2/10/2022) 180 capsule 1     sulfaSALAzine (AZULFIDINE) 500 MG tablet Take 2 tablets (1,000 mg) by mouth 2 times daily MUST BE SEEN IN CLINIC FOR ANY FURTHER REFILLS - CALL TO MAKE APPOINTMENT (Patient not taking: Reported on 2/10/2022) 120 tablet 0     family history is not on file.  Social Connections: Not on file          WBC   Date Value Ref Range Status   04/21/2021 7.2 4.0 - 11.0 thou/uL Final   10/23/2014 6.5 4.0 - 11.0 10e9/L Final     RBC Count   Date Value Ref Range Status   04/21/2021 5.31 4.40 - 6.20 mill/uL Final   10/23/2014 4.70 4.4 - 5.9 10e12/L Final     Hemoglobin   Date Value Ref Range Status   04/21/2021 15.9 14.0 - 18.0 g/dL Final   10/23/2014 15.1 13.3 - 17.7 g/dL Final     Hematocrit   Date Value Ref Range Status   04/21/2021 47.2 40.0 - 54.0 % Final   10/23/2014 43.6 40.0 - 53.0 % Final     MCV   Date Value Ref Range Status   04/21/2021 89 80 - 100 fL Final   10/23/2014 93 78 - 100 fl Final     MCH   Date Value Ref Range Status   04/21/2021 29.9 27.0 - 34.0 pg Final   10/23/2014 32.1 26.5 - 33.0 pg Final     Platelet Count   Date Value Ref Range Status   04/21/2021 225 140 - 440 thou/uL Final   10/23/2014 198 150 - 450 10e9/L Final     % Lymphocytes   Date Value Ref Range Status   09/09/2019 24 20 - 40 % Final   02/01/2012 20 20 - 48 %      AST   Date Value Ref Range Status   09/23/2019 13 0 - 40 U/L Final   10/23/2014 20 0 - 45 U/L Final     ALT   Date Value Ref Range Status   04/21/2021 27 0 - 45 U/L  Final   10/23/2014 29 0 - 70 U/L Final     Albumin   Date Value Ref Range Status   04/21/2021 4.0 3.5 - 5.0 g/dL Final   07/27/2009 4.2 3.9 - 5.1 g/dL Final     Alkaline Phosphatase   Date Value Ref Range Status   09/23/2019 78 45 - 120 U/L Final   03/27/2007 60 40 - 150 U/L Final     Creatinine   Date Value Ref Range Status   04/21/2021 0.80 0.70 - 1.30 mg/dL Final   10/23/2014 0.87 0.66 - 1.25 mg/dL Final     GFR Estimate   Date Value Ref Range Status   04/21/2021 >60 >60 mL/min/1.73m2 Final   10/23/2014 >90  Non  GFR Calc   >60 mL/min/1.7m2 Final     GFR Estimate If Black   Date Value Ref Range Status   04/21/2021 >60 >60 mL/min/1.73m2 Final   10/23/2014 >90   GFR Calc   >60 mL/min/1.7m2 Final     Sed Rate   Date Value Ref Range Status   04/10/2013 13 0 - 15 mm/h Final     Erythrocyte Sedimentation Rate   Date Value Ref Range Status   09/09/2019 80 (H) 0 - 15 mm/hr Final     CRP Inflammation   Date Value Ref Range Status   10/23/2014 <2.9 0.0 - 8.0 mg/L Final     CRP   Date Value Ref Range Status   09/09/2019 5.2 (H) 0.0 - 0.8 mg/dL Final

## 2022-02-13 ENCOUNTER — HEALTH MAINTENANCE LETTER (OUTPATIENT)
Age: 47
End: 2022-02-13

## 2022-03-29 DIAGNOSIS — Z00.00 ENCOUNTER FOR GENERAL ADULT MEDICAL EXAMINATION WITHOUT ABNORMAL FINDINGS: ICD-10-CM

## 2022-03-29 DIAGNOSIS — M45.5 ANKYLOSING SPONDYLITIS OF THORACOLUMBAR REGION (H): ICD-10-CM

## 2022-03-31 RX ORDER — NAPROXEN 500 MG/1
TABLET ORAL
Qty: 60 TABLET | Refills: 2 | Status: SHIPPED | OUTPATIENT
Start: 2022-03-31 | End: 2022-10-11

## 2022-03-31 NOTE — TELEPHONE ENCOUNTER
"Routing refill request to provider for review/approval because:  Labs not current:  AST  Patient needs to be seen because it has been more than 2 1/2 years since last office visit.  No upcoming appt on file.    Last Written Prescription Date:  10/27/2021  Last Fill Quantity: 60,  # refills: 2   Last office visit provider:   09/23/2019 with Dr Arriaga.    Requested Prescriptions   Pending Prescriptions Disp Refills     naproxen (NAPROSYN) 500 MG tablet [Pharmacy Med Name: NAPROXEN 500 MG TABLET] 60 tablet 2     Sig: TAKE 1 TABLET BY MOUTH TWICE A DAY WITH MEALS       NSAID Medications Failed - 3/29/2022 12:28 AM        Failed - Normal AST on file in past 12 months     Recent Labs   Lab Test 09/23/19  0825   AST 13             Failed - Recent (12 mo) or future (30 days) visit within the authorizing provider's specialty     Patient has had an office visit with the authorizing provider or a provider within the authorizing providers department within the previous 12 mos or has a future within next 30 days. See \"Patient Info\" tab in inbasket, or \"Choose Columns\" in Meds & Orders section of the refill encounter.              Passed - Blood pressure under 140/90 in past 12 months     BP Readings from Last 3 Encounters:   02/10/22 112/84   01/16/22 (!) 158/99   01/06/20 130/84                 Passed - Normal ALT on file in past 12 months     Recent Labs   Lab Test 02/10/22  0934   ALT 29             Passed - Patient is age 6-64 years        Passed - Normal CBC on file in past 12 months     Recent Labs   Lab Test 02/10/22  0934   WBC 6.6   RBC 5.06   HGB 15.4   HCT 45.8                    Passed - Medication is active on med list        Passed - Normal serum creatinine on file in past 12 months     Recent Labs   Lab Test 02/10/22  0934   CR 0.80       Ok to refill medication if creatinine is low               Kaylee Chaudhry 03/31/22 8:50 AM  "

## 2022-06-01 ENCOUNTER — TELEPHONE (OUTPATIENT)
Dept: RHEUMATOLOGY | Facility: CLINIC | Age: 47
End: 2022-06-01
Payer: COMMERCIAL

## 2022-06-01 NOTE — TELEPHONE ENCOUNTER
PA Initiation    Medication:   Insurance Company: [employer] - Phone [phone] Fax [phone]  Pharmacy Filling the Rx: [facility] - [place], [place] - [facility] [address]  Filling Pharmacy Phone: [phone]  Filling Pharmacy Fax: [phone]  Start Date: 6/1/2022

## 2022-06-03 NOTE — TELEPHONE ENCOUNTER
Prior Authorization Approval    Authorization Effective Date: 6/3/2022  Authorization Expiration Date: 6/3/2023  Medication:   Approved Dose/Quantity: 4  Reference #: [id number]   Insurance Company: [employer] - Phone [phone] Fax [phone]  Expected CoPay:       CoPay Card Available:      Foundation Assistance Needed:    Which Pharmacy is filling the prescription (Not needed for infusion/clinic administered): [facility] - [place] [address] [address]  Pharmacy Notified: Yes  Patient Notified: Yes

## 2022-08-24 DIAGNOSIS — M45.8 ANKYLOSING SPONDYLITIS OF SACRAL REGION (H): ICD-10-CM

## 2022-08-24 NOTE — TELEPHONE ENCOUNTER
Please call pt to schedule lab appt now and follow up with Dr Cobos       Lab orders in chart

## 2022-08-25 ENCOUNTER — LAB (OUTPATIENT)
Dept: LAB | Facility: CLINIC | Age: 47
End: 2022-08-25
Payer: COMMERCIAL

## 2022-08-25 DIAGNOSIS — Z79.899 HIGH RISK MEDICATION USE: ICD-10-CM

## 2022-08-25 LAB
ALBUMIN SERPL BCG-MCNC: 4.2 G/DL (ref 3.5–5.2)
ALT SERPL W P-5'-P-CCNC: 11 U/L (ref 10–50)
CREAT SERPL-MCNC: 0.76 MG/DL (ref 0.67–1.17)
ERYTHROCYTE [DISTWIDTH] IN BLOOD BY AUTOMATED COUNT: 11.6 % (ref 10–15)
GFR SERPL CREATININE-BSD FRML MDRD: >90 ML/MIN/1.73M2
HCT VFR BLD AUTO: 44.8 % (ref 40–53)
HGB BLD-MCNC: 15.3 G/DL (ref 13.3–17.7)
MCH RBC QN AUTO: 30.5 PG (ref 26.5–33)
MCHC RBC AUTO-ENTMCNC: 34.2 G/DL (ref 31.5–36.5)
MCV RBC AUTO: 89 FL (ref 78–100)
PLATELET # BLD AUTO: 258 10E3/UL (ref 150–450)
RBC # BLD AUTO: 5.01 10E6/UL (ref 4.4–5.9)
WBC # BLD AUTO: 7.6 10E3/UL (ref 4–11)

## 2022-08-25 PROCEDURE — 84460 ALANINE AMINO (ALT) (SGPT): CPT

## 2022-08-25 PROCEDURE — 85027 COMPLETE CBC AUTOMATED: CPT

## 2022-08-25 PROCEDURE — 82565 ASSAY OF CREATININE: CPT

## 2022-08-25 PROCEDURE — 82040 ASSAY OF SERUM ALBUMIN: CPT

## 2022-08-25 PROCEDURE — 36415 COLL VENOUS BLD VENIPUNCTURE: CPT

## 2022-08-26 RX ORDER — MEDROXYPROGESTERONE ACETATE 150 MG/ML
INJECTION, SUSPENSION INTRAMUSCULAR
Qty: 4 ML | Refills: 4 | Status: SHIPPED | OUTPATIENT
Start: 2022-08-26 | End: 2023-01-02

## 2022-10-10 DIAGNOSIS — M45.5 ANKYLOSING SPONDYLITIS OF THORACOLUMBAR REGION (H): ICD-10-CM

## 2022-10-10 DIAGNOSIS — Z00.00 ENCOUNTER FOR GENERAL ADULT MEDICAL EXAMINATION WITHOUT ABNORMAL FINDINGS: ICD-10-CM

## 2022-10-10 NOTE — TELEPHONE ENCOUNTER
"Routing refill request to provider for review/approval because:  Labs not current:  AST  Patient needs to be seen because it has been more than 3 years since last office visit.    Last Written Prescription Date:  3/31/2  Last Fill Quantity: 60,  # refills: 2   Last office visit provider:  9/23/2019     Requested Prescriptions   Pending Prescriptions Disp Refills     naproxen (NAPROSYN) 500 MG tablet [Pharmacy Med Name: NAPROXEN 500 MG TABLET] 60 tablet 2     Sig: TAKE 1 TABLET BY MOUTH TWICE A DAY WITH MEALS       NSAID Medications Failed - 10/10/2022 11:49 AM        Failed - Normal AST on file in past 12 months     Recent Labs   Lab Test 09/23/19  0825   AST 13             Failed - Recent (12 mo) or future (30 days) visit within the authorizing provider's specialty     Patient has had an office visit with the authorizing provider or a provider within the authorizing providers department within the previous 12 mos or has a future within next 30 days. See \"Patient Info\" tab in inbasket, or \"Choose Columns\" in Meds & Orders section of the refill encounter.              Passed - Blood pressure under 140/90 in past 12 months     BP Readings from Last 3 Encounters:   02/10/22 112/84   01/16/22 (!) 158/99   01/06/20 130/84                 Passed - Normal ALT on file in past 12 months     Recent Labs   Lab Test 08/25/22  1318   ALT 11             Passed - Patient is age 6-64 years        Passed - Normal CBC on file in past 12 months     Recent Labs   Lab Test 08/25/22  1318   WBC 7.6   RBC 5.01   HGB 15.3   HCT 44.8                    Passed - Medication is active on med list        Passed - Normal serum creatinine on file in past 12 months     Recent Labs   Lab Test 08/25/22  1318   CR 0.76       Ok to refill medication if creatinine is low               Nirav Yo, RN 10/10/22 11:50 AM  "

## 2022-10-11 RX ORDER — NAPROXEN 500 MG/1
TABLET ORAL
Qty: 60 TABLET | Refills: 2 | Status: SHIPPED | OUTPATIENT
Start: 2022-10-11 | End: 2023-04-12

## 2022-10-16 ENCOUNTER — HEALTH MAINTENANCE LETTER (OUTPATIENT)
Age: 47
End: 2022-10-16

## 2023-01-02 DIAGNOSIS — M45.8 ANKYLOSING SPONDYLITIS OF SACRAL REGION (H): ICD-10-CM

## 2023-01-02 RX ORDER — MEDROXYPROGESTERONE ACETATE 150 MG/ML
INJECTION, SUSPENSION INTRAMUSCULAR
Qty: 4 ML | Refills: 2 | Status: SHIPPED | OUTPATIENT
Start: 2023-01-02 | End: 2023-04-19

## 2023-03-01 ENCOUNTER — NURSE TRIAGE (OUTPATIENT)
Dept: NURSING | Facility: CLINIC | Age: 48
End: 2023-03-01
Payer: COMMERCIAL

## 2023-03-01 NOTE — TELEPHONE ENCOUNTER
Patient is calling and states that he is having persistent cough, sinus congestion.  Symptoms started over one month ago.  Patient did a home covid test and was negative.  Patient dneies bluish lips and denies difficulty breathing and denies chest pain when not coughing and denies coughing up blood and denies fever.  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  Patient also states that he was on a plane in [place] weeks ago and feels that is where he caught his symptoms from other passengers.  Patient will go to urgent car if no openings in clinic.      Reason for Disposition    Continuous (nonstop) coughing interferes with work or school and no improvement using cough treatment per Care Advice    Additional Information    Negative: Bluish (or gray) lips or face    Negative: SEVERE difficulty breathing (e.g., struggling for each breath, speaks in single words)    Negative: Rapid onset of cough and has hives    Negative: Coughing started suddenly after medicine, an allergic food or bee sting    Negative: Difficulty breathing after exposure to flames, smoke, or fumes    Negative: Sounds like a life-threatening emergency to the triager    Negative: MODERATE difficulty breathing (e.g., speaks in phrases, SOB even at rest, pulse 100-120) and still present when not coughing    Negative: Chest pain present when not coughing    Negative: Passed out (i.e., fainted, collapsed and was not responding)    Negative: Patient sounds very sick or weak to the triager    Negative: MILD difficulty breathing (e.g., minimal/no SOB at rest, SOB with walking, pulse <100) and still present when not coughing    Negative: Coughed up > 1 tablespoon (15 ml) blood (Exception: Blood-tinged sputum.)    Negative: Fever > 103 F (39.4 C)    Negative: Fever > 101 F (38.3 C) and over 60 years of age    Negative: Fever > 100.0 F (37.8 C) and has diabetes mellitus or a weak immune system (e.g., HIV positive, cancer  chemotherapy, organ transplant, splenectomy, chronic steroids)    Negative: Fever > 100.0 F (37.8 C) and bedridden (e.g., nursing home patient, stroke, chronic illness, recovering from surgery)    Negative: Increasing ankle swelling    Negative: Wheezing is present    Negative: SEVERE coughing spells (e.g., whooping sound after coughing, vomiting after coughing)    Negative: Coughing up joaquina-colored (reddish-brown) or blood-tinged sputum    Negative: Fever present > 3 days (72 hours)    Negative: Fever returns after gone for over 24 hours and symptoms worse or not improved    Negative: Using nasal washes and pain medicine > 24 hours and sinus pain persists    Negative: Known COPD or other severe lung disease (i.e., bronchiectasis, cystic fibrosis, lung surgery) and worsening symptoms (i.e., increased sputum purulence or amount, increased breathing difficulty)    Protocols used: COUGH-A-OH

## 2023-03-26 ENCOUNTER — HEALTH MAINTENANCE LETTER (OUTPATIENT)
Age: 48
End: 2023-03-26

## 2023-04-04 DIAGNOSIS — M45.8 ANKYLOSING SPONDYLITIS OF SACRAL REGION (H): ICD-10-CM

## 2023-04-04 RX ORDER — MEDROXYPROGESTERONE ACETATE 150 MG/ML
INJECTION, SUSPENSION INTRAMUSCULAR
Qty: 4 ML | Refills: 2 | OUTPATIENT
Start: 2023-04-04

## 2023-04-12 ENCOUNTER — OFFICE VISIT (OUTPATIENT)
Dept: FAMILY MEDICINE | Facility: CLINIC | Age: 48
End: 2023-04-12
Payer: COMMERCIAL

## 2023-04-12 VITALS
DIASTOLIC BLOOD PRESSURE: 80 MMHG | OXYGEN SATURATION: 97 % | TEMPERATURE: 97.5 F | BODY MASS INDEX: 27.61 KG/M2 | HEART RATE: 85 BPM | WEIGHT: 197.25 LBS | SYSTOLIC BLOOD PRESSURE: 120 MMHG | HEIGHT: 71 IN

## 2023-04-12 DIAGNOSIS — Z00.00 HEALTHCARE MAINTENANCE: ICD-10-CM

## 2023-04-12 DIAGNOSIS — K64.9 HEMORRHOIDS, UNSPECIFIED HEMORRHOID TYPE: ICD-10-CM

## 2023-04-12 DIAGNOSIS — Z98.890 HISTORY OF REPAIR OF HIP JOINT: ICD-10-CM

## 2023-04-12 DIAGNOSIS — Z00.00 ANNUAL PHYSICAL EXAM: ICD-10-CM

## 2023-04-12 DIAGNOSIS — Z11.4 SCREENING FOR HIV (HUMAN IMMUNODEFICIENCY VIRUS): Primary | ICD-10-CM

## 2023-04-12 DIAGNOSIS — N52.01 ERECTILE DYSFUNCTION DUE TO ARTERIAL INSUFFICIENCY: ICD-10-CM

## 2023-04-12 DIAGNOSIS — Z12.5 SCREENING FOR PROSTATE CANCER: ICD-10-CM

## 2023-04-12 DIAGNOSIS — J45.20 MILD INTERMITTENT ASTHMA WITHOUT COMPLICATION: ICD-10-CM

## 2023-04-12 DIAGNOSIS — Z12.11 SPECIAL SCREENING FOR MALIGNANT NEOPLASMS, COLON: ICD-10-CM

## 2023-04-12 DIAGNOSIS — M45.5 ANKYLOSING SPONDYLITIS OF THORACOLUMBAR REGION (H): ICD-10-CM

## 2023-04-12 DIAGNOSIS — Z00.00 ENCOUNTER FOR GENERAL ADULT MEDICAL EXAMINATION WITHOUT ABNORMAL FINDINGS: ICD-10-CM

## 2023-04-12 PROBLEM — M47.816 LUMBAR SPONDYLOSIS: Status: RESOLVED | Noted: 2020-01-06 | Resolved: 2023-04-12

## 2023-04-12 LAB
ALBUMIN SERPL BCG-MCNC: 4.6 G/DL (ref 3.5–5.2)
ALP SERPL-CCNC: 63 U/L (ref 40–129)
ALT SERPL W P-5'-P-CCNC: 25 U/L (ref 10–50)
ANION GAP SERPL CALCULATED.3IONS-SCNC: 11 MMOL/L (ref 7–15)
AST SERPL W P-5'-P-CCNC: 27 U/L (ref 10–50)
BILIRUB SERPL-MCNC: 0.3 MG/DL
BUN SERPL-MCNC: 20.5 MG/DL (ref 6–20)
CALCIUM SERPL-MCNC: 9.7 MG/DL (ref 8.6–10)
CHLORIDE SERPL-SCNC: 103 MMOL/L (ref 98–107)
CHOLEST SERPL-MCNC: 199 MG/DL
CREAT SERPL-MCNC: 0.91 MG/DL (ref 0.67–1.17)
DEPRECATED HCO3 PLAS-SCNC: 26 MMOL/L (ref 22–29)
ERYTHROCYTE [DISTWIDTH] IN BLOOD BY AUTOMATED COUNT: 11.7 % (ref 10–15)
GFR SERPL CREATININE-BSD FRML MDRD: >90 ML/MIN/1.73M2
GLUCOSE SERPL-MCNC: 101 MG/DL (ref 70–99)
HCT VFR BLD AUTO: 47.3 % (ref 40–53)
HDLC SERPL-MCNC: 38 MG/DL
HGB BLD-MCNC: 16.2 G/DL (ref 13.3–17.7)
LDLC SERPL CALC-MCNC: 127 MG/DL
MCH RBC QN AUTO: 31 PG (ref 26.5–33)
MCHC RBC AUTO-ENTMCNC: 34.2 G/DL (ref 31.5–36.5)
MCV RBC AUTO: 90 FL (ref 78–100)
NONHDLC SERPL-MCNC: 161 MG/DL
PLATELET # BLD AUTO: 222 10E3/UL (ref 150–450)
POTASSIUM SERPL-SCNC: 4.6 MMOL/L (ref 3.4–5.3)
PROT SERPL-MCNC: 7.9 G/DL (ref 6.4–8.3)
PSA SERPL DL<=0.01 NG/ML-MCNC: 0.79 NG/ML (ref 0–2.5)
RBC # BLD AUTO: 5.23 10E6/UL (ref 4.4–5.9)
SODIUM SERPL-SCNC: 140 MMOL/L (ref 136–145)
TRIGL SERPL-MCNC: 168 MG/DL
WBC # BLD AUTO: 7.2 10E3/UL (ref 4–11)

## 2023-04-12 PROCEDURE — 90677 PCV20 VACCINE IM: CPT | Performed by: STUDENT IN AN ORGANIZED HEALTH CARE EDUCATION/TRAINING PROGRAM

## 2023-04-12 PROCEDURE — 90471 IMMUNIZATION ADMIN: CPT | Performed by: STUDENT IN AN ORGANIZED HEALTH CARE EDUCATION/TRAINING PROGRAM

## 2023-04-12 PROCEDURE — 80053 COMPREHEN METABOLIC PANEL: CPT | Performed by: STUDENT IN AN ORGANIZED HEALTH CARE EDUCATION/TRAINING PROGRAM

## 2023-04-12 PROCEDURE — 85027 COMPLETE CBC AUTOMATED: CPT | Performed by: STUDENT IN AN ORGANIZED HEALTH CARE EDUCATION/TRAINING PROGRAM

## 2023-04-12 PROCEDURE — 99203 OFFICE O/P NEW LOW 30 MIN: CPT | Mod: 25 | Performed by: STUDENT IN AN ORGANIZED HEALTH CARE EDUCATION/TRAINING PROGRAM

## 2023-04-12 PROCEDURE — 99386 PREV VISIT NEW AGE 40-64: CPT | Mod: 25 | Performed by: STUDENT IN AN ORGANIZED HEALTH CARE EDUCATION/TRAINING PROGRAM

## 2023-04-12 PROCEDURE — 90472 IMMUNIZATION ADMIN EACH ADD: CPT | Performed by: STUDENT IN AN ORGANIZED HEALTH CARE EDUCATION/TRAINING PROGRAM

## 2023-04-12 PROCEDURE — 90750 HZV VACC RECOMBINANT IM: CPT | Performed by: STUDENT IN AN ORGANIZED HEALTH CARE EDUCATION/TRAINING PROGRAM

## 2023-04-12 PROCEDURE — 36415 COLL VENOUS BLD VENIPUNCTURE: CPT | Performed by: STUDENT IN AN ORGANIZED HEALTH CARE EDUCATION/TRAINING PROGRAM

## 2023-04-12 PROCEDURE — 80061 LIPID PANEL: CPT | Performed by: STUDENT IN AN ORGANIZED HEALTH CARE EDUCATION/TRAINING PROGRAM

## 2023-04-12 PROCEDURE — G0103 PSA SCREENING: HCPCS | Performed by: STUDENT IN AN ORGANIZED HEALTH CARE EDUCATION/TRAINING PROGRAM

## 2023-04-12 RX ORDER — ALBUTEROL SULFATE 90 UG/1
2 AEROSOL, METERED RESPIRATORY (INHALATION) EVERY 6 HOURS PRN
Qty: 18 G | Refills: 0 | Status: SHIPPED | OUTPATIENT
Start: 2023-04-12

## 2023-04-12 RX ORDER — NAPROXEN 500 MG/1
500 TABLET ORAL 2 TIMES DAILY WITH MEALS
Qty: 60 TABLET | Refills: 2 | Status: SHIPPED | OUTPATIENT
Start: 2023-04-12 | End: 2023-11-08

## 2023-04-12 ASSESSMENT — ENCOUNTER SYMPTOMS
COUGH: 1
DYSURIA: 0
ARTHRALGIAS: 1
NERVOUS/ANXIOUS: 1
HEMATOCHEZIA: 0
JOINT SWELLING: 1
DIARRHEA: 0
PARESTHESIAS: 0
DIZZINESS: 0
CHILLS: 0
FEVER: 0
ABDOMINAL PAIN: 0
FREQUENCY: 0
HEMATURIA: 0
MYALGIAS: 1
SORE THROAT: 0
PALPITATIONS: 0
EYE PAIN: 0
WEAKNESS: 0
HEADACHES: 0
NAUSEA: 0
HEARTBURN: 0
CONSTIPATION: 0
SHORTNESS OF BREATH: 0

## 2023-04-12 ASSESSMENT — ASTHMA QUESTIONNAIRES
QUESTION_1 LAST FOUR WEEKS HOW MUCH OF THE TIME DID YOUR ASTHMA KEEP YOU FROM GETTING AS MUCH DONE AT WORK, SCHOOL OR AT HOME: NONE OF THE TIME
QUESTION_3 LAST FOUR WEEKS HOW OFTEN DID YOUR ASTHMA SYMPTOMS (WHEEZING, COUGHING, SHORTNESS OF BREATH, CHEST TIGHTNESS OR PAIN) WAKE YOU UP AT NIGHT OR EARLIER THAN USUAL IN THE MORNING: NOT AT ALL
QUESTION_4 LAST FOUR WEEKS HOW OFTEN HAVE YOU USED YOUR RESCUE INHALER OR NEBULIZER MEDICATION (SUCH AS ALBUTEROL): NOT AT ALL
QUESTION_2 LAST FOUR WEEKS HOW OFTEN HAVE YOU HAD SHORTNESS OF BREATH: NOT AT ALL

## 2023-04-12 ASSESSMENT — PAIN SCALES - GENERAL: PAINLEVEL: NO PAIN (0)

## 2023-04-12 NOTE — PROGRESS NOTES
PLAN  48-year-old male with past medical history of ankylosing spondylitis who presents for annual physical exam and establishment of care    1. Screening for HIV (human immunodeficiency virus)  Patient refused    2. Ankylosing spondylitis of thoracolumbar region (H)  Sees rheumatology Dr. Cobos.  Currently on Enbrel and takes naproxen as needed.  Feels the symptoms are controlled at this time.  Has not seen rheumatology for a year and I recommended routine follow-up.  - naproxen (NAPROSYN) 500 MG tablet; Take 1 tablet (500 mg) by mouth 2 times daily (with meals)  Dispense: 60 tablet; Refill: 2    3. Encounter for general adult medical examination without abnormal findings  - naproxen (NAPROSYN) 500 MG tablet; Take 1 tablet (500 mg) by mouth 2 times daily (with meals)  Dispense: 60 tablet; Refill: 2    4. History of repair of hip joint  Bilateral hip replacements related to AS    5. Hemorrhoids, unspecified hemorrhoid type  S/p hemorrhoidectomy    6. Mild intermittent asthma without complication  Patient having a little bit of cough after a sinus infection and likely viral illness.  He has a history of asthma which was thought to be exercise-induced.  I recommended retrying albuterol to see if this improves the cough.  Otherwise most likely postviral and a recommended watchful waiting is likely will improve with time  - albuterol (PROAIR HFA/PROVENTIL HFA/VENTOLIN HFA) 108 (90 Base) MCG/ACT inhaler; Inhale 2 puffs into the lungs every 6 hours as needed for shortness of breath, wheezing or cough  Dispense: 18 g; Refill: 0    7. Erectile dysfunction due to arterial insufficiency  Worked with urology in the past. Given some recommendations but does not medications at this time    8. Screening for prostate cancer  - PSA, screen; Future    9. Special screening for malignant neoplasms, colon  - Colonoscopy Screening  Referral; Future    10. Annual physical exam  - CBC with platelets; Future  - Comprehensive  metabolic panel (BMP + Alb, Alk Phos, ALT, AST, Total. Bili, TP); Future  - Lipid panel reflex to direct LDL Non-fasting; Future    Follow-up in: 1 year for physical    Tod Landry MD    Subjective:     Jesus Olivares is a 48 year old male who presents for an annual exam.     Chief Complaint   Patient presents with     Physical     Sinus Problem     Had a sinus infection since March, but still feeling a lingering cough      Medication Refill     Patient tells me he started getting sick after a trip to [place] in march. [facility] of [facility] with the second helping. Still having lingering cough.     Colonoscopy:has never had  PSA:  No results found for: PSA     Answers for HPI/ROS submitted by the patient on 4/12/2023  Frequency of exercise:: 2-3 days/week  Getting at least 3 servings of Calcium per day:: NO  Diet:: Regular (no restrictions)  Taking medications regularly:: Yes  Medication side effects:: None  Bi-annual eye exam:: NO  Dental care twice a year:: NO  Sleep apnea or symptoms of sleep apnea:: None  abdominal pain: No  Blood in stool: No  Blood in urine: No  chest pain: No  chills: No  congestion: No  constipation: No  cough: Yes  diarrhea: No  dizziness: No  ear pain: No  eye pain: No  nervous/anxious: Yes  fever: No  frequency: No  genital sores: No  headaches: No  hearing loss: No  heartburn: No  arthralgias: Yes  joint swelling: Yes  peripheral edema: No  mood changes: Yes  myalgias: Yes  nausea: No  dysuria: No  palpitations: No  Skin sensation changes: No  sore throat: No  urgency: No  rash: No  shortness of breath: No  visual disturbance: No  weakness: No  impotence: Yes  penile discharge: No  Additional concerns today:: Yes  Duration of exercise:: 45-60 minutes    Immunization History   Administered Date(s) Administered     COVID-19 Vaccine 12+ (Pfizer 2022) 04/03/2022     COVID-19 Vaccine 12+ (Pfizer) 01/06/2021, 10/12/2021     COVID-19 Vaccine  Bivalent Booster 12+ (Pfizer) 11/08/2022     HepB 01/02/2013     Influenza (IIV3) PF 10/01/2011, 09/15/2012     Immunization status: due today.     Current Outpatient Medications   Medication Sig Dispense Refill     ACETAMINOPHEN 500 MG OR TABS 2 tablets as needed       Carboxymethylcellul-Glycerin (CLEAR EYES FOR DRY EYES OP) Apply 1 drop to eye daily as needed        etanercept (ENBREL SURECLICK) 50 MG/ML autoinjector INJECT THE CONTENTS OF 1 AUTOINJECTOR PEN UNDER THE SKIN EVERY WEEK 4 mL 2     FISH OIL 1200 mg twice daily       naproxen (NAPROSYN) 500 MG tablet TAKE 1 TABLET BY MOUTH TWICE A DAY WITH MEALS 60 tablet 2     LIDODERM 5 % EX PTCH Apply 1-3 daily as neede to painful areas 90 0     Past Medical History:   Diagnosis Date     Hypertension      No past surgical history on file.  Patient has no known allergies.  Family History   Problem Relation Age of Onset     Glaucoma No family hx of      Macular Degeneration No family hx of      Social History     Socioeconomic History     Marital status:      Spouse name: Not on file     Number of children: Not on file     Years of education: Not on file     Highest education level: Not on file   Occupational History     Not on file   Tobacco Use     Smoking status: Never     Smokeless tobacco: Never   Vaping Use     Vaping status: Not on file   Substance and Sexual Activity     Alcohol use: Yes     Drug use: No     Sexual activity: Yes     Partners: Female   Other Topics Concern     Not on file   Social History Narrative     Not on file     Social Determinants of Health     Financial Resource Strain: Not on file   Food Insecurity: Not on file   Transportation Needs: Not on file   Physical Activity: Not on file   Stress: Not on file   Social Connections: Not on file   Intimate Partner Violence: Not on file   Housing Stability: Not on file       Review of Systems  Complete ROS negative except as noted in the HPI    Objective:      Vitals:    04/12/23 0926   BP:  "120/80   Pulse: 85   Temp: 97.5  F (36.4  C)   SpO2: 97%   Weight: 89.5 kg (197 lb 4 oz)   Height: 1.803 m (5' 11\")       General appearance: Alert, cooperative, no distress, appears stated age, overweight  Head: Normocephalic, atraumatic, without obvious abnormality  EARS: TM's gray dull with structures seen bilaterally  Eyes: Pupils equal round, reactive.  Conjunctiva clear.  Nose: Nares normal, no drainage.  Throat: Lips, mucosa, tongue normal mucosa pink and moist  Neck: Supple, symmetric, trachea midline, no adenopathy.  No thyroid enlargement, tenderness or nodules.    Lungs: Clear to auscultation bilaterally, no wheezing or crackles present.  Respirations unlabored  Heart: Regular rate and rhythm, normal S1 and S2, no murmur, rub or gallop.  Abdomen: Soft, nontender, nondistended.  Bowel sounds active in all 4 quadrants.  No masses or organomegaly.  Extremities: Extremities normal, atraumatic.  No cyanosis or edema.  Skin: Skin color, texture, turgor normal no rashes or lesions on limited skin exam  Neurologic: CN II through XII intact, normal strength.      Tod Hill MD    "

## 2023-04-13 NOTE — RESULT ENCOUNTER NOTE
Jesus  Your results from your recent clinic visit show:  Your CMP was normal with normal electrolytes, kidney function, and liver function  Your lipids look ok and I used these as well as other factors from your history to calculate your 10 year risk of having something like a heart attack (ASCVD risk) and it was low risk. Just continue to work on exercise and diet to maintain this low risk.    The 10-year ASCVD risk score (Joy SAVAGE, et al., 2019) is: 3.7%    Values used to calculate the score:      Age: 48 years      Sex: Male      Is Non- : No      Diabetic: No      Tobacco smoker: No      Systolic Blood Pressure: 120 mmHg      Is BP treated: No      HDL Cholesterol: 38 mg/dL      Total Cholesterol: 199 mg/dL    Your PSA, which is a screen for prostate cancer, was normal  Your CBC is normal with no anemia or signs of infection seen    If you have more questions please call the clinic at [phone] or send me a Tumri message    Dr. Tod Landry

## 2023-04-19 ENCOUNTER — TELEPHONE (OUTPATIENT)
Dept: RHEUMATOLOGY | Facility: CLINIC | Age: 48
End: 2023-04-19
Payer: COMMERCIAL

## 2023-04-19 DIAGNOSIS — M45.8 ANKYLOSING SPONDYLITIS OF SACRAL REGION (H): ICD-10-CM

## 2023-04-19 RX ORDER — MEDROXYPROGESTERONE ACETATE 150 MG/ML
INJECTION, SUSPENSION INTRAMUSCULAR
Qty: 4 ML | Refills: 1 | Status: SHIPPED | OUTPATIENT
Start: 2023-04-19 | End: 2023-06-14

## 2023-04-19 RX ORDER — MEDROXYPROGESTERONE ACETATE 150 MG/ML
INJECTION, SUSPENSION INTRAMUSCULAR
Qty: 4 ML | Refills: 2 | OUTPATIENT
Start: 2023-04-19

## 2023-05-22 ENCOUNTER — TELEPHONE (OUTPATIENT)
Dept: RHEUMATOLOGY | Facility: CLINIC | Age: 48
End: 2023-05-22
Payer: COMMERCIAL

## 2023-05-22 NOTE — TELEPHONE ENCOUNTER
PA Initiation    Medication: ENBREL SURECLICK 50 MG/ML SC SOAJ  Insurance Company: [employer] - Phone [phone] Fax [phone]  Pharmacy Filling the Rx:    Filling Pharmacy Phone:    Filling Pharmacy Fax:    Start Date:  05/22/2023    Prior Authorization Approval    Medication: ENBREL SURECLICK 50 MG/ML SC SOAJ  Authorization Effective Date: 5/22/2023  Authorization Expiration Date: 5/21/2024  Approved Dose/Quantity: 4mL  Reference #: [id number]   Insurance Company: [employer] - Phone [phone] Fax [phone]  Expected CoPay:       CoPay Card Available:    yes  Financial Assistance Needed:NA  Which Pharmacy is filling the prescription: [facility] - [place], [place] - [address] [place] [address]  Pharmacy Notified: No  Patient Notified: No      .    TAMARA Finnegan, [facility]  Specialty Pharmacy Clinic Liaison     [facility]    [email or web address]     Phone: [phone]  Fax: [phone]

## 2023-06-01 ENCOUNTER — OFFICE VISIT (OUTPATIENT)
Dept: RHEUMATOLOGY | Facility: CLINIC | Age: 48
End: 2023-06-01
Payer: COMMERCIAL

## 2023-06-01 VITALS
WEIGHT: 199.3 LBS | SYSTOLIC BLOOD PRESSURE: 130 MMHG | DIASTOLIC BLOOD PRESSURE: 90 MMHG | HEART RATE: 72 BPM | BODY MASS INDEX: 27.8 KG/M2

## 2023-06-01 DIAGNOSIS — Z96.643 HISTORY OF BILATERAL HIP ARTHROPLASTY: ICD-10-CM

## 2023-06-01 DIAGNOSIS — M45.8 ANKYLOSING SPONDYLITIS OF SACRAL REGION (H): Primary | ICD-10-CM

## 2023-06-01 DIAGNOSIS — M47.816 LUMBAR SPONDYLOSIS: ICD-10-CM

## 2023-06-01 DIAGNOSIS — Z79.899 HIGH RISK MEDICATION USE: ICD-10-CM

## 2023-06-01 PROCEDURE — 99214 OFFICE O/P EST MOD 30 MIN: CPT | Performed by: INTERNAL MEDICINE

## 2023-06-01 NOTE — PROGRESS NOTES
Rheumatology follow-up visit note     Jesus is a 48 year old male presents today for follow-up.    Jesus was seen today for recheck.    Diagnoses and all orders for this visit:    Ankylosing spondylitis of sacral region (H)  -     XR Sacroiliac Joint G/E 3 Views; Future  -     XR Lumbar Spine 2-3 Views; Future  -     XR Cervical Spine G/E 4 Views; Future  -     Erythrocyte sedimentation rate auto; Standing  -     CRP inflammation; Standing  -     ALT; Standing  -     Albumin level; Standing  -     CBC with platelets; Standing  -     Creatinine; Standing    High risk medication use  -     Erythrocyte sedimentation rate auto; Standing  -     CRP inflammation; Standing  -     ALT; Standing  -     Albumin level; Standing  -     CBC with platelets; Standing  -     Creatinine; Standing    Lumbar spondylosis  -     XR Sacroiliac Joint G/E 3 Views; Future  -     XR Lumbar Spine 2-3 Views; Future  -     XR Cervical Spine G/E 4 Views; Future    History of bilateral hip arthroplasty        This patient with ankylosing spondylitis on Enbrel has noted ongoing stiffness in the neck and lower back as well as painful ankles, the latter appears to be a biomechanical phenomenon.  He has valgus deformity.  He is going to check with podiatry.  With regards to the back stiffness of 2 hours further observations, labs and x-rays as noted.  We will meet at this time in 3 months.    Follow up in 3 months.    HPI    Jesus Olivares is a 48 year old male is here for follow-up.  He has ankylosing spondylitis, on Enbrel.  Recently he was on a vacation came back this past Sunday.  He noted worsening pain.  This was in his ankles.  Worse on the right side.  He has had injury in the past no surgery was done.  He has noted neck and back pain and stiffness that can last 2 hours.  When seen originally he had he had noted Enbrel provided him 90% of relief from his background original symptoms.  Today he noted pain level lower at a 5.0/10  "he is able to do all his day-to-day activities without difficulty.  He has noted no swelling apart from the right ankle that has chronically been \"bigger\" compared with the left.  This has been since his injury.  Prior to Enbrel he was on Cosentyx.  He had felt that Enbrel that provided him with better relief.  He is status post bilateral hip arthroplasty.  On his previous visit we had discussed the discomfort that he has described at that time orthopedic referral was recommended he was unable to follow-up on that.     Following is the excerpt from a previous note:   here for establishment of care of back pain, diagnosis of ankylosing spondylitis.  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  He reports that his symptoms began in 1994.  He recalls pain having started on the right hip area he points to the trochanteric region than the left side was affected to, over the subsequent years he had tried various modalities including sulfasalazine, by June 1997 he had a right hip arthroplasty and in the same year and December the left hip arthroplasty.  He recalls initially he was started on sulfasalazine nonsteroidals which provided him some relief.  Then he was given Enbrel that he found quite helpful Humira was tried it that was not of much use he was then put back on Enbrel that he has been on for many years apart from the intermittent times aware the insurance change necessitated not taking it such as now.  His most recent Enbrel intake was in April of this year.  Over the past 5 months he has managed the symptoms taking naproxen, Tylenol.  This provides him with perhaps a 40% relief.  He noted that some of the worst pains are in the neck between this shoulder blades in the lower back.  When he sits for too long the pain does radiate down the right leg sometimes all the way to the foot.  Nighttime the pain is severe enough to keep him up, morning stiffness can last 2 to 3 hours.  " He has not observed pain in other joint areas in any of the appendicular system.  He has not observed swelling.  He has not seen features suggestive of dactylitis.  He has no features suggestive of enthesitis of the peripheral joints.  He recalls no features suggestive of uveitis/iritis, personal or family history of psoriasis ulcerative colitis or Crohn's disease.  When he was on Enbrel he felt that there was a 90% improvement in his pain and stiffness  DETAILED EXAMINATION  06/01/23  :    Vitals:    06/01/23 0859   BP: (!) 130/90   Pulse: 72   Weight: 90.4 kg (199 lb 4.8 oz)     Alert oriented. Head including the face is examined for malar rash, heliotropes, scarring, lupus pernio. Eyes examined for redness such as in episcleritis/scleritis, periorbital lesions.   Neck/ Face examined for parotid gland swelling, range of motion of neck.  Left upper and lower and right upper and lower extremities examined for tenderness, swelling, warmth of the appendicular joints, range of motion, edema, rash.  Some of the important findings included: he does not have evidence of synovitis in the palpable joints of the upper extremities.  No significant deformities of the digits.  no Heberden nodes.  Range of motion of the shoulders  show full abduction.  No JLT effusion or warmth of the knees.  he does not have dactylitis of the digits.  Occipital wall is 0.  He has valgus deformity both ankles worse on the right side.  There is no dactylitis of the toes.  He does not have synovitis of the tibiotalar joints.  There is no enthesitis such as Achilles.     Patient Active Problem List    Diagnosis Date Noted     Erectile dysfunction due to arterial insufficiency 04/12/2023     Priority: Medium     History of repair of hip joint 01/06/2020     Priority: Medium     Total hip replacement on both sides       Hemorrhoids 06/17/2019     Priority: Medium     hemorrhoidectomy       Healthcare maintenance 01/09/2014     Priority: Medium      "Colonoscopy:has never had- referred 4/23  PSA:  Prostate Specific Antigen Screen   Date Value Ref Range Status   04/12/2023 0.79 0.00 - 2.50 ng/mL Final     4/23:  The 10-year ASCVD risk score (Joy SAVAGE, et al., 2019) is: 3.7%    Values used to calculate the score:      Age: 48 years      Sex: Male      Is Non- : No      Diabetic: No      Tobacco smoker: No      Systolic Blood Pressure: 120 mmHg      Is BP treated: No      HDL Cholesterol: 38 mg/dL      Total Cholesterol: 199 mg/dL       Allergic rhinitis 09/01/2013     Priority: Medium     Mild intermittent asthma 09/01/2013     Priority: Medium     Ankylosing spondylitis (H) 01/15/2008     Priority: Medium     Last rheumatology appointment 2/22    Jesus is a 46 year old male presents today for follow-up.     Jesus was seen today for recheck.     Diagnoses and all orders for this visit:     Ankylosing spondylitis of sacral region (H)  -     etanercept (ENBREL SURECLICK) 50 MG/ML autoinjector; INJECT THE CONTENTS OF ONE PEN UNDER THE SKIN EVERY SEVEN DAYS     Lumbar spondylosis     History of bilateral hip arthroplasty     High risk medication use  -     Albumin level; Standing  -     ALT; Standing  -     CBC with platelets; Standing  -     Creatinine; Standing           This patient's ankylosing spondylitis symptoms are better with Enbrel, since he switched from Cosentyx.  He continues to have what he describes as \"hip pain\" pointing to the lateral trochanteric area.  He is status post bilateral hip arthroplasty several years ago.  He is planning to go see the orthopedics in the near future.  We will meet here in 6 months labs today continue Enbrel as now.       No past surgical history on file.   Past Medical History:   Diagnosis Date     Hypertension      No Known Allergies  Current Outpatient Medications   Medication Sig Dispense Refill     ACETAMINOPHEN 500 MG OR TABS 2 tablets as needed       albuterol (PROAIR HFA/PROVENTIL " HFA/VENTOLIN HFA) 108 (90 Base) MCG/ACT inhaler Inhale 2 puffs into the lungs every 6 hours as needed for shortness of breath, wheezing or cough 18 g 0     Carboxymethylcellul-Glycerin (CLEAR EYES FOR DRY EYES OP) Apply 1 drop to eye daily as needed        etanercept (ENBREL SURECLICK) 50 MG/ML autoinjector INJECT THE CONTENTS OF 1 AUTOINJECTOR PEN UNDER THE SKIN EVERY WEEK 4 mL 1     naproxen (NAPROSYN) 500 MG tablet Take 1 tablet (500 mg) by mouth 2 times daily (with meals) 60 tablet 2     family history is not on file.  Social Connections: Not on file          WBC   Date Value Ref Range Status   10/23/2014 6.5 4.0 - 11.0 10e9/L Final     WBC Count   Date Value Ref Range Status   04/12/2023 7.2 4.0 - 11.0 10e3/uL Final     RBC Count   Date Value Ref Range Status   04/12/2023 5.23 4.40 - 5.90 10e6/uL Final   10/23/2014 4.70 4.4 - 5.9 10e12/L Final     Hemoglobin   Date Value Ref Range Status   04/12/2023 16.2 13.3 - 17.7 g/dL Final   10/23/2014 15.1 13.3 - 17.7 g/dL Final     Hematocrit   Date Value Ref Range Status   04/12/2023 47.3 40.0 - 53.0 % Final   10/23/2014 43.6 40.0 - 53.0 % Final     MCV   Date Value Ref Range Status   04/12/2023 90 78 - 100 fL Final   10/23/2014 93 78 - 100 fl Final     MCH   Date Value Ref Range Status   04/12/2023 31.0 26.5 - 33.0 pg Final   10/23/2014 32.1 26.5 - 33.0 pg Final     Platelet Count   Date Value Ref Range Status   04/12/2023 222 150 - 450 10e3/uL Final   10/23/2014 198 150 - 450 10e9/L Final     % Lymphocytes   Date Value Ref Range Status   09/09/2019 24 20 - 40 % Final   02/01/2012 20 20 - 48 %      AST   Date Value Ref Range Status   04/12/2023 27 10 - 50 U/L Final   10/23/2014 20 0 - 45 U/L Final     ALT   Date Value Ref Range Status   04/12/2023 25 10 - 50 U/L Final   10/23/2014 29 0 - 70 U/L Final     Albumin   Date Value Ref Range Status   04/12/2023 4.6 3.5 - 5.2 g/dL Final   02/10/2022 3.8 3.5 - 5.0 g/dL Final   07/27/2009 4.2 3.9 - 5.1 g/dL Final     Alkaline  Phosphatase   Date Value Ref Range Status   04/12/2023 63 40 - 129 U/L Final   03/27/2007 60 40 - 150 U/L Final     Creatinine   Date Value Ref Range Status   04/12/2023 0.91 0.67 - 1.17 mg/dL Final   10/23/2014 0.87 0.66 - 1.25 mg/dL Final     GFR Estimate   Date Value Ref Range Status   04/12/2023 >90 >60 mL/min/1.73m2 Final     Comment:     eGFR calculated using 2021 CKD-EPI equation.   04/21/2021 >60 >60 mL/min/1.73m2 Final   10/23/2014 >90  Non  GFR Calc   >60 mL/min/1.7m2 Final     GFR Estimate If Black   Date Value Ref Range Status   04/21/2021 >60 >60 mL/min/1.73m2 Final   10/23/2014 >90   GFR Calc   >60 mL/min/1.7m2 Final     Sed Rate   Date Value Ref Range Status   04/10/2013 13 0 - 15 mm/h Final     Erythrocyte Sedimentation Rate   Date Value Ref Range Status   09/09/2019 80 (H) 0 - 15 mm/hr Final     CRP Inflammation   Date Value Ref Range Status   10/23/2014 <2.9 0.0 - 8.0 mg/L Final     CRP   Date Value Ref Range Status   09/09/2019 5.2 (H) 0.0 - 0.8 mg/dL Final

## 2023-06-07 ENCOUNTER — TELEPHONE (OUTPATIENT)
Dept: FAMILY MEDICINE | Facility: CLINIC | Age: 48
End: 2023-06-07
Payer: COMMERCIAL

## 2023-06-08 ENCOUNTER — HOSPITAL ENCOUNTER (OUTPATIENT)
Dept: GENERAL RADIOLOGY | Facility: HOSPITAL | Age: 48
Discharge: HOME | End: 2023-06-08
Attending: INTERNAL MEDICINE
Payer: COMMERCIAL

## 2023-06-08 DIAGNOSIS — M45.8 ANKYLOSING SPONDYLITIS OF SACRAL REGION (H): ICD-10-CM

## 2023-06-08 DIAGNOSIS — M47.816 LUMBAR SPONDYLOSIS: ICD-10-CM

## 2023-06-08 PROCEDURE — 72202 X-RAY EXAM SI JOINTS 3/> VWS: CPT

## 2023-06-08 PROCEDURE — 72050 X-RAY EXAM NECK SPINE 4/5VWS: CPT

## 2023-06-08 PROCEDURE — 72100 X-RAY EXAM L-S SPINE 2/3 VWS: CPT

## 2023-06-12 ENCOUNTER — ALLIED HEALTH/NURSE VISIT (OUTPATIENT)
Dept: FAMILY MEDICINE | Facility: CLINIC | Age: 48
End: 2023-06-12
Payer: COMMERCIAL

## 2023-06-12 DIAGNOSIS — B02.9 SHINGLES: Primary | ICD-10-CM

## 2023-06-12 PROCEDURE — 90471 IMMUNIZATION ADMIN: CPT

## 2023-06-12 PROCEDURE — 90750 HZV VACC RECOMBINANT IM: CPT

## 2023-06-12 PROCEDURE — 99207 PR NO CHARGE NURSE ONLY: CPT

## 2023-06-12 NOTE — PROGRESS NOTES
Prior to immunization administration, verified patients identity using patient s name and date of birth. Please see Immunization Activity for additional information.     Screening Questionnaire for Adult Immunization    Are you sick today?   No   Do you have allergies to medications, food, a vaccine component or latex?   No   Have you ever had a serious reaction after receiving a vaccination?   No   Do you have a long-term health problem with heart, lung, kidney, or metabolic disease (e.g., diabetes), asthma, a blood disorder, no spleen, complement component deficiency, a cochlear implant, or a spinal fluid leak?  Are you on long-term aspirin therapy?   No   Do you have cancer, leukemia, HIV/AIDS, or any other immune system problem?   No   Do you have a parent, brother, or sister with an immune system problem?   No   In the past 3 months, have you taken medications that affect  your immune system, such as prednisone, other steroids, or anticancer drugs; drugs for the treatment of rheumatoid arthritis, Crohn s disease, or psoriasis; or have you had radiation treatments?   No   Have you had a seizure, or a brain or other nervous system problem?   No   During the past year, have you received a transfusion of blood or blood    products, or been given immune (gamma) globulin or antiviral drug?   No   For women: Are you pregnant or is there a chance you could become       pregnant during the next month?   No   Have you received any vaccinations in the past 4 weeks?   No     Immunization questionnaire answers were all negative.    I have reviewed the following standing orders:   This patient is due and qualifies for the Zoster vaccine.    Click here for Zoster Standing Order    I have reviewed the vaccines inclusion and exclusion criteria; No concerns regarding eligibility.         Injection of Shingles vaccine given by Franny Wallace MA. Patient instructed to remain in clinic for 15 minutes afterwards, and to report  any adverse reactions.     Screening performed by Franny Wallace MA on 6/12/2023 at 8:20 AM.

## 2023-06-13 ENCOUNTER — LAB (OUTPATIENT)
Dept: LAB | Facility: CLINIC | Age: 48
End: 2023-06-13
Payer: COMMERCIAL

## 2023-06-13 DIAGNOSIS — M45.8 ANKYLOSING SPONDYLITIS OF SACRAL REGION (H): ICD-10-CM

## 2023-06-13 DIAGNOSIS — Z79.899 HIGH RISK MEDICATION USE: ICD-10-CM

## 2023-06-13 LAB
ALBUMIN SERPL BCG-MCNC: 4 G/DL (ref 3.5–5.2)
ALT SERPL W P-5'-P-CCNC: 24 U/L (ref 0–70)
CREAT SERPL-MCNC: 0.81 MG/DL (ref 0.67–1.17)
CRP SERPL-MCNC: 14.3 MG/L
ERYTHROCYTE [DISTWIDTH] IN BLOOD BY AUTOMATED COUNT: 11.5 % (ref 10–15)
ERYTHROCYTE [SEDIMENTATION RATE] IN BLOOD BY WESTERGREN METHOD: 12 MM/HR (ref 0–15)
GFR SERPL CREATININE-BSD FRML MDRD: >90 ML/MIN/1.73M2
HCT VFR BLD AUTO: 45.7 % (ref 40–53)
HGB BLD-MCNC: 15.9 G/DL (ref 13.3–17.7)
MCH RBC QN AUTO: 31.4 PG (ref 26.5–33)
MCHC RBC AUTO-ENTMCNC: 34.8 G/DL (ref 31.5–36.5)
MCV RBC AUTO: 90 FL (ref 78–100)
PLATELET # BLD AUTO: 183 10E3/UL (ref 150–450)
RBC # BLD AUTO: 5.06 10E6/UL (ref 4.4–5.9)
WBC # BLD AUTO: 6.9 10E3/UL (ref 4–11)

## 2023-06-13 PROCEDURE — 85652 RBC SED RATE AUTOMATED: CPT

## 2023-06-13 PROCEDURE — 84460 ALANINE AMINO (ALT) (SGPT): CPT

## 2023-06-13 PROCEDURE — 82040 ASSAY OF SERUM ALBUMIN: CPT

## 2023-06-13 PROCEDURE — 85027 COMPLETE CBC AUTOMATED: CPT

## 2023-06-13 PROCEDURE — 36415 COLL VENOUS BLD VENIPUNCTURE: CPT

## 2023-06-13 PROCEDURE — 82565 ASSAY OF CREATININE: CPT

## 2023-06-13 PROCEDURE — 86140 C-REACTIVE PROTEIN: CPT

## 2023-06-14 DIAGNOSIS — M45.8 ANKYLOSING SPONDYLITIS OF SACRAL REGION (H): ICD-10-CM

## 2023-06-14 RX ORDER — MEDROXYPROGESTERONE ACETATE 150 MG/ML
INJECTION, SUSPENSION INTRAMUSCULAR
Qty: 4 ML | Refills: 4 | Status: SHIPPED | OUTPATIENT
Start: 2023-06-14 | End: 2023-09-05

## 2023-09-05 ENCOUNTER — OFFICE VISIT (OUTPATIENT)
Dept: RHEUMATOLOGY | Facility: CLINIC | Age: 48
End: 2023-09-05
Payer: COMMERCIAL

## 2023-09-05 ENCOUNTER — TELEPHONE (OUTPATIENT)
Dept: RHEUMATOLOGY | Facility: CLINIC | Age: 48
End: 2023-09-05

## 2023-09-05 VITALS
DIASTOLIC BLOOD PRESSURE: 82 MMHG | OXYGEN SATURATION: 98 % | HEART RATE: 75 BPM | WEIGHT: 196.2 LBS | SYSTOLIC BLOOD PRESSURE: 138 MMHG | BODY MASS INDEX: 27.36 KG/M2

## 2023-09-05 DIAGNOSIS — M47.816 FACET ARTHROPATHY, LUMBAR: ICD-10-CM

## 2023-09-05 DIAGNOSIS — Z79.899 HIGH RISK MEDICATION USE: ICD-10-CM

## 2023-09-05 DIAGNOSIS — Z96.643 HISTORY OF BILATERAL HIP ARTHROPLASTY: ICD-10-CM

## 2023-09-05 DIAGNOSIS — M47.816 LUMBAR SPONDYLOSIS: ICD-10-CM

## 2023-09-05 DIAGNOSIS — M45.8 ANKYLOSING SPONDYLITIS OF SACRAL REGION (H): Primary | ICD-10-CM

## 2023-09-05 PROCEDURE — 99214 OFFICE O/P EST MOD 30 MIN: CPT | Performed by: INTERNAL MEDICINE

## 2023-09-05 NOTE — PROGRESS NOTES
"      Rheumatology follow-up visit note     Jesus is a 48 year old male presents today for follow-up.    Jesus was seen today for recheck.    Diagnoses and all orders for this visit:    Ankylosing spondylitis of sacral region (H)  -     adalimumab (HUMIRA *CF*) 40 MG/0.4ML pen kit; Inject 0.4 mLs (40 mg) Subcutaneous every 14 days for 30 days Hold for signs of infection, then seek medical attention.    High risk medication use    Lumbar spondylosis    History of bilateral hip arthroplasty    Facet arthropathy, lumbar        This patient with sacroiliitis/ankylosing spondylitis which has been better controlled with Enbrel is hurting quite a bit more now.  His as the score is 2.9.  This is on account of him running out of the co-pay card.  And not been able to get Enbrel.  We discussed options.  He would like to try Humira.  We discussed the pros and cons of switching.  Given the bulk of his symptoms are axial at this point \"DMARDs, \"CS may not be suitable.  We will meet here in 3 months.    Follow up in 3 months.    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  He has noted prolonged stiffness in the lower back, pain in her shoulders bilaterally.  There is been no swelling.  No features suggestive of iritis.  His asdas score is calculated as below.  . Prior to Enbrel he was on Cosentyx. He had felt that Enbrel that provided him with better relief. He is status post bilateral hip arthroplasty.     Back Pain (BASDAI Question 2) [0-10]   7     Peripheral Pain/Swelling (BASDAI Question 3) [0-10]   6  Duration Morning Stiffness (BASDAI Question " 6) [0-10]   7     Patient Global [0-10]   5  Clear  C-Reactive Protein                        mg/l  mg/dl   2         ASDAS-CRP 2.9      DETAILED EXAMINATION  09/05/23  :    Vitals:    09/05/23 0915   BP: 138/82   Pulse: 75   SpO2: 98%   Weight: 89 kg (196 lb 3.2 oz)     Alert oriented. Head including the face is examined for malar rash, heliotropes, scarring, lupus pernio. Eyes examined for redness such as in episcleritis/scleritis, periorbital lesions.   Neck/ Face examined for parotid gland swelling, range of motion of neck.  Left upper and lower and right upper and lower extremities examined for tenderness, swelling, warmth of the appendicular joints, range of motion, edema, rash.  Some of the important findings included: he does not have evidence of synovitis in the palpable joints of the upper extremities.  No significant deformities of the digits.  no Heberden nodes.  Range of motion of the shoulders  show full abduction.  No JLT effusion or warmth of the knees.  he does not have dactylitis of the digits. the occipital wall distance is 0.  He has a scar on his left knee from a prior injury, surgery.  He has impingement in both his shoulders.  There is no dactylitis of digits or toes.     Patient Active Problem List    Diagnosis Date Noted    Erectile dysfunction due to arterial insufficiency 04/12/2023     Priority: Medium    History of repair of hip joint 01/06/2020     Priority: Medium     Total hip replacement on both sides      Hemorrhoids 06/17/2019     Priority: Medium     hemorrhoidectomy      Healthcare maintenance 01/09/2014     Priority: Medium     Colonoscopy:has never had- referred 4/23  PSA:  Prostate Specific Antigen Screen   Date Value Ref Range Status   04/12/2023 0.79 0.00 - 2.50 ng/mL Final   4/23:  The 10-year ASCVD risk score (Joy SAVAGE, et al., 2019) is: 3.7%    Values used to calculate the score:      Age: 48 years      Sex: Male      Is Non- : No      Diabetic:  "No      Tobacco smoker: No      Systolic Blood Pressure: 120 mmHg      Is BP treated: No      HDL Cholesterol: 38 mg/dL      Total Cholesterol: 199 mg/dL      Allergic rhinitis 09/01/2013     Priority: Medium    Mild intermittent asthma 09/01/2013     Priority: Medium    Ankylosing spondylitis (H) 01/15/2008     Priority: Medium     Last rheumatology appointment 2/22    Jesus is a 46 year old male presents today for follow-up.     Jesus was seen today for recheck.     Diagnoses and all orders for this visit:     Ankylosing spondylitis of sacral region (H)  -     etanercept (ENBREL SURECLICK) 50 MG/ML autoinjector; INJECT THE CONTENTS OF ONE PEN UNDER THE SKIN EVERY SEVEN DAYS     Lumbar spondylosis     History of bilateral hip arthroplasty     High risk medication use  -     Albumin level; Standing  -     ALT; Standing  -     CBC with platelets; Standing  -     Creatinine; Standing           This patient's ankylosing spondylitis symptoms are better with Enbrel, since he switched from Cosentyx.  He continues to have what he describes as \"hip pain\" pointing to the lateral trochanteric area.  He is status post bilateral hip arthroplasty several years ago.  He is planning to go see the orthopedics in the near future.  We will meet here in 6 months labs today continue Enbrel as now.       No past surgical history on file.   Past Medical History:   Diagnosis Date    Hypertension      No Known Allergies  Current Outpatient Medications   Medication Sig Dispense Refill    ACETAMINOPHEN 500 MG OR TABS 2 tablets as needed      albuterol (PROAIR HFA/PROVENTIL HFA/VENTOLIN HFA) 108 (90 Base) MCG/ACT inhaler Inhale 2 puffs into the lungs every 6 hours as needed for shortness of breath, wheezing or cough 18 g 0    ENBREL SURECLICK 50 MG/ML autoinjector INJECT THE CONTENTS OF 1 AUTOINJECTOR PEN UNDER THE SKIN EVERY WEEK 4 mL 4    naproxen (NAPROSYN) 500 MG tablet Take 1 tablet (500 mg) by mouth 2 times daily (with meals) 60 " tablet 2    Carboxymethylcellul-Glycerin (CLEAR EYES FOR DRY EYES OP) Apply 1 drop to eye daily as needed  (Patient not taking: Reported on 9/5/2023)       family history is not on file.  Social Connections: Not on file          WBC   Date Value Ref Range Status   10/23/2014 6.5 4.0 - 11.0 10e9/L Final     WBC Count   Date Value Ref Range Status   06/13/2023 6.9 4.0 - 11.0 10e3/uL Final     RBC Count   Date Value Ref Range Status   06/13/2023 5.06 4.40 - 5.90 10e6/uL Final   10/23/2014 4.70 4.4 - 5.9 10e12/L Final     Hemoglobin   Date Value Ref Range Status   06/13/2023 15.9 13.3 - 17.7 g/dL Final   10/23/2014 15.1 13.3 - 17.7 g/dL Final     Hematocrit   Date Value Ref Range Status   06/13/2023 45.7 40.0 - 53.0 % Final   10/23/2014 43.6 40.0 - 53.0 % Final     MCV   Date Value Ref Range Status   06/13/2023 90 78 - 100 fL Final   10/23/2014 93 78 - 100 fl Final     MCH   Date Value Ref Range Status   06/13/2023 31.4 26.5 - 33.0 pg Final   10/23/2014 32.1 26.5 - 33.0 pg Final     Platelet Count   Date Value Ref Range Status   06/13/2023 183 150 - 450 10e3/uL Final   10/23/2014 198 150 - 450 10e9/L Final     % Lymphocytes   Date Value Ref Range Status   09/09/2019 24 20 - 40 % Final   02/01/2012 20 20 - 48 %      AST   Date Value Ref Range Status   04/12/2023 27 10 - 50 U/L Final   10/23/2014 20 0 - 45 U/L Final     ALT   Date Value Ref Range Status   06/13/2023 24 0 - 70 U/L Final     Comment:     Reference intervals for this test were updated on 6/12/2023 to more accurately reflect our healthy population. There may be differences in the flagging of prior results with similar values performed with this method. Interpretation of those prior results can be made in the context of the updated reference intervals.     10/23/2014 29 0 - 70 U/L Final     Albumin   Date Value Ref Range Status   06/13/2023 4.0 3.5 - 5.2 g/dL Final   02/10/2022 3.8 3.5 - 5.0 g/dL Final   07/27/2009 4.2 3.9 - 5.1 g/dL Final     Alkaline  Phosphatase   Date Value Ref Range Status   04/12/2023 63 40 - 129 U/L Final   03/27/2007 60 40 - 150 U/L Final     Creatinine   Date Value Ref Range Status   06/13/2023 0.81 0.67 - 1.17 mg/dL Final   10/23/2014 0.87 0.66 - 1.25 mg/dL Final     GFR Estimate   Date Value Ref Range Status   06/13/2023 >90 >60 mL/min/1.73m2 Final     Comment:     eGFR calculated using 2021 CKD-EPI equation.   04/21/2021 >60 >60 mL/min/1.73m2 Final   10/23/2014 >90  Non  GFR Calc   >60 mL/min/1.7m2 Final     GFR Estimate If Black   Date Value Ref Range Status   04/21/2021 >60 >60 mL/min/1.73m2 Final   10/23/2014 >90   GFR Calc   >60 mL/min/1.7m2 Final     Sed Rate   Date Value Ref Range Status   04/10/2013 13 0 - 15 mm/h Final     Erythrocyte Sedimentation Rate   Date Value Ref Range Status   06/13/2023 12 0 - 15 mm/hr Final     CRP Inflammation   Date Value Ref Range Status   10/23/2014 <2.9 0.0 - 8.0 mg/L Final     CRP   Date Value Ref Range Status   09/09/2019 5.2 (H) 0.0 - 0.8 mg/dL Final

## 2023-09-05 NOTE — TELEPHONE ENCOUNTER
PA Initiation    Medication: HUMIRA *CF* PEN 40 MG/0.4ML SC PNKT  Insurance Company: [employer] - Phone [phone] Fax [phone]  Pharmacy Filling the Rx: [facility] - [place], [place] - [facility] [address]  Filling Pharmacy Phone:    Filling Pharmacy Fax:    Start Date: 9/5/2023     Jesus Olivares (Key: [id number])

## 2023-09-06 NOTE — TELEPHONE ENCOUNTER
Pt is calling to follow up in the Humira prescription; Pt would like a call back to let him know how to go about obtaining the Humira medication.

## 2023-09-06 NOTE — TELEPHONE ENCOUNTER
Prior Authorization Approval    Medication: HUMIRA *CF* PEN 40 MG/0.4ML SC PNKT  Authorization Effective Date: 9/6/2023  Authorization Expiration Date: 3/3/2024  Approved Dose/Quantity: 2  Reference #: [id number]   Insurance Company: [employer] - Phone [phone] Fax [phone]  Expected CoPay:       CoPay Card Available: Yes    Financial Assistance Needed: yes  Which Pharmacy is filling the prescription: [facility] - [address]  Pharmacy Notified: Yes  Patient Notified: Yes          TAMARA Finnegan, [facility]  Specialty Pharmacy Clinic Liaison     [facility]    [email or web address]     Phone: [phone]  Fax: [phone]

## 2023-09-06 NOTE — TELEPHONE ENCOUNTER
Pt notified that Humira PA was approved and rx was sent to  Specialty pharmacy this morning. Pt can call pharmacy to schedule delivery

## 2023-10-30 ENCOUNTER — TRANSFERRED RECORDS (OUTPATIENT)
Dept: HEALTH INFORMATION MANAGEMENT | Facility: CLINIC | Age: 48
End: 2023-10-30
Payer: COMMERCIAL

## 2023-11-02 PROBLEM — Z86.0100 HISTORY OF COLONIC POLYPS: Status: ACTIVE | Noted: 2023-11-02

## 2023-11-08 DIAGNOSIS — Z00.00 ENCOUNTER FOR GENERAL ADULT MEDICAL EXAMINATION WITHOUT ABNORMAL FINDINGS: ICD-10-CM

## 2023-11-08 DIAGNOSIS — M45.5 ANKYLOSING SPONDYLITIS OF THORACOLUMBAR REGION (H): ICD-10-CM

## 2023-11-08 RX ORDER — NAPROXEN 500 MG/1
500 TABLET ORAL 2 TIMES DAILY WITH MEALS
Qty: 60 TABLET | Refills: 2 | Status: SHIPPED | OUTPATIENT
Start: 2023-11-08 | End: 2024-05-15

## 2023-12-05 ENCOUNTER — LAB (OUTPATIENT)
Dept: LAB | Facility: CLINIC | Age: 48
End: 2023-12-05
Payer: COMMERCIAL

## 2023-12-05 ENCOUNTER — OFFICE VISIT (OUTPATIENT)
Dept: RHEUMATOLOGY | Facility: CLINIC | Age: 48
End: 2023-12-05
Payer: COMMERCIAL

## 2023-12-05 VITALS
OXYGEN SATURATION: 97 % | SYSTOLIC BLOOD PRESSURE: 120 MMHG | BODY MASS INDEX: 28.26 KG/M2 | DIASTOLIC BLOOD PRESSURE: 82 MMHG | HEART RATE: 74 BPM | WEIGHT: 202.6 LBS

## 2023-12-05 DIAGNOSIS — Z79.899 HIGH RISK MEDICATION USE: ICD-10-CM

## 2023-12-05 DIAGNOSIS — M45.8 ANKYLOSING SPONDYLITIS OF SACRAL REGION (H): ICD-10-CM

## 2023-12-05 DIAGNOSIS — Z96.643 HISTORY OF BILATERAL HIP ARTHROPLASTY: ICD-10-CM

## 2023-12-05 DIAGNOSIS — M47.816 LUMBAR SPONDYLOSIS: ICD-10-CM

## 2023-12-05 DIAGNOSIS — M45.8 ANKYLOSING SPONDYLITIS OF SACRAL REGION (H): Primary | ICD-10-CM

## 2023-12-05 DIAGNOSIS — M47.816 FACET ARTHROPATHY, LUMBAR: ICD-10-CM

## 2023-12-05 LAB
ALBUMIN SERPL BCG-MCNC: 4.2 G/DL (ref 3.5–5.2)
ALT SERPL W P-5'-P-CCNC: 28 U/L (ref 0–70)
CREAT SERPL-MCNC: 0.8 MG/DL (ref 0.67–1.17)
CRP SERPL-MCNC: <3 MG/L
EGFRCR SERPLBLD CKD-EPI 2021: >90 ML/MIN/1.73M2
ERYTHROCYTE [DISTWIDTH] IN BLOOD BY AUTOMATED COUNT: 11.8 % (ref 10–15)
ERYTHROCYTE [SEDIMENTATION RATE] IN BLOOD BY WESTERGREN METHOD: 7 MM/HR (ref 0–15)
HCT VFR BLD AUTO: 46.8 % (ref 40–53)
HGB BLD-MCNC: 16.5 G/DL (ref 13.3–17.7)
MCH RBC QN AUTO: 31.5 PG (ref 26.5–33)
MCHC RBC AUTO-ENTMCNC: 35.3 G/DL (ref 31.5–36.5)
MCV RBC AUTO: 89 FL (ref 78–100)
PLATELET # BLD AUTO: 222 10E3/UL (ref 150–450)
RBC # BLD AUTO: 5.24 10E6/UL (ref 4.4–5.9)
WBC # BLD AUTO: 7.2 10E3/UL (ref 4–11)

## 2023-12-05 PROCEDURE — 86140 C-REACTIVE PROTEIN: CPT

## 2023-12-05 PROCEDURE — 85652 RBC SED RATE AUTOMATED: CPT

## 2023-12-05 PROCEDURE — 82565 ASSAY OF CREATININE: CPT

## 2023-12-05 PROCEDURE — 85027 COMPLETE CBC AUTOMATED: CPT

## 2023-12-05 PROCEDURE — 36415 COLL VENOUS BLD VENIPUNCTURE: CPT

## 2023-12-05 PROCEDURE — 82040 ASSAY OF SERUM ALBUMIN: CPT

## 2023-12-05 PROCEDURE — 99214 OFFICE O/P EST MOD 30 MIN: CPT | Performed by: INTERNAL MEDICINE

## 2023-12-05 PROCEDURE — 84460 ALANINE AMINO (ALT) (SGPT): CPT

## 2023-12-05 NOTE — PROGRESS NOTES
Rheumatology follow-up visit note     Jesus is a 48 year old male presents today for follow-up.    Jesus was seen today for recheck.    Diagnoses and all orders for this visit:    Ankylosing spondylitis of sacral region (H)  -     ALT; Standing  -     Albumin level; Standing  -     CBC with platelets; Standing  -     Creatinine; Standing  -     CRP inflammation; Standing  -     Erythrocyte sedimentation rate auto; Standing    High risk medication use  -     ALT; Standing  -     Albumin level; Standing  -     CBC with platelets; Standing  -     Creatinine; Standing  -     CRP inflammation; Standing  -     Erythrocyte sedimentation rate auto; Standing    Lumbar spondylosis    History of bilateral hip arthroplasty    Facet arthropathy, lumbar        This patient with ankylosing spondylitis manifested by inflammatory back pain, sacroiliac erosions or sclerosis has found the Humira to be 70% effective, he was on Enbrel prior.  He has residual pain and stiffness.  We talked about lumbar spondylosis.  The role of strengthening of the core muscles.  He is due for labs.  Will check those today.  He is to stay the course with the femur.  Will meet here in 4 months or sooner.    Follow up in 4 months.    HPI    Jesus Olivares is a 48 year old male is here for follow-up of  Ankylosing spondylitis predominantly of the lumbosacral region, he is now on Humira after a 2-month break.  There was lack of access in part secondary to financial barriers to Enbrel, changed to Humira, he feels the Humira is not as effective as Enbrel though he noted 70% improvement, the last few days of the cycle he has more stiffness, he takes naproxen every day once a day.  He is going to take naproxen 2 times a day during the latter half of the 2-week cycle of Humira.  While he was on Enbrel he was doing so much better but now for the past 1 month he has not been able to get it because it caused him $1300 as he ran out of the money on the  Enbrel co-pay card which evidently had lasted him the whole year last year.  He has noted prolonged stiffness in the lower back, pain in her shoulders bilaterally.  There is been no swelling.  No features suggestive of iritis.  His asdas score is calculated as below.  . Prior to Enbrel he was on Cosentyx. He had felt that Enbrel that provided him with better relief. He is status post bilateral hip arthroplasty.       DETAILED EXAMINATION  12/05/23  :    Vitals:    12/05/23 0840   BP: 120/82   Pulse: 74   SpO2: 97%   Weight: 91.9 kg (202 lb 9.6 oz)     Alert oriented. Head including the face is examined for malar rash, heliotropes, scarring, lupus pernio. Eyes examined for redness such as in episcleritis/scleritis, periorbital lesions.   Neck/ Face examined for parotid gland swelling, range of motion of neck.  Left upper and lower and right upper and lower extremities examined for tenderness, swelling, warmth of the appendicular joints, range of motion, edema, rash.  Some of the important findings included: he does not have evidence of synovitis in the palpable joints of the upper extremities.  No significant deformities of the digits.  He does not have dactylitis of digits.  His occiput to wall distance is 0.     Patient Active Problem List    Diagnosis Date Noted    History of colonic polyps 11/02/2023     Priority: Medium     Sessile polyp descending colon, tubular adenoma by biopsy      Erectile dysfunction due to arterial insufficiency 04/12/2023     Priority: Medium    History of repair of hip joint 01/06/2020     Priority: Medium     Total hip replacement on both sides      Hemorrhoids 06/17/2019     Priority: Medium     hemorrhoidectomy      Healthcare maintenance 01/09/2014     Priority: Medium     Colonoscopy:has never had- referred 4/23  PSA:  Prostate Specific Antigen Screen   Date Value Ref Range Status   04/12/2023 0.79 0.00 - 2.50 ng/mL Final   4/23:  The 10-year ASCVD risk score (Joy SAVAGE, et al.,  "2019) is: 3.7%    Values used to calculate the score:      Age: 48 years      Sex: Male      Is Non- : No      Diabetic: No      Tobacco smoker: No      Systolic Blood Pressure: 120 mmHg      Is BP treated: No      HDL Cholesterol: 38 mg/dL      Total Cholesterol: 199 mg/dL      Allergic rhinitis 09/01/2013     Priority: Medium    Mild intermittent asthma 09/01/2013     Priority: Medium    Ankylosing spondylitis (H) 01/15/2008     Priority: Medium     Last rheumatology appointment 2/22    Jesus is a 46 year old male presents today for follow-up.     Jesus was seen today for recheck.     Diagnoses and all orders for this visit:     Ankylosing spondylitis of sacral region (H)  -     etanercept (ENBREL SURECLICK) 50 MG/ML autoinjector; INJECT THE CONTENTS OF ONE PEN UNDER THE SKIN EVERY SEVEN DAYS     Lumbar spondylosis     History of bilateral hip arthroplasty     High risk medication use  -     Albumin level; Standing  -     ALT; Standing  -     CBC with platelets; Standing  -     Creatinine; Standing           This patient's ankylosing spondylitis symptoms are better with Enbrel, since he switched from Cosentyx.  He continues to have what he describes as \"hip pain\" pointing to the lateral trochanteric area.  He is status post bilateral hip arthroplasty several years ago.  He is planning to go see the orthopedics in the near future.  We will meet here in 6 months labs today continue Enbrel as now.       No past surgical history on file.   Past Medical History:   Diagnosis Date    Hypertension      No Known Allergies  Current Outpatient Medications   Medication Sig Dispense Refill    ACETAMINOPHEN 500 MG OR TABS 2 tablets as needed      adalimumab (HUMIRA *CF*) 40 MG/0.4ML pen kit Inject 0.4 mLs (40 mg) Subcutaneous every 14 days for 30 days Hold for signs of infection, then seek medical attention. 0.8 mL 11    albuterol (PROAIR HFA/PROVENTIL HFA/VENTOLIN HFA) 108 (90 Base) MCG/ACT inhaler " Inhale 2 puffs into the lungs every 6 hours as needed for shortness of breath, wheezing or cough 18 g 0    Carboxymethylcellul-Glycerin (CLEAR EYES FOR DRY EYES OP) Apply 1 drop to eye daily as needed  (Patient not taking: Reported on 9/5/2023)      naproxen (NAPROSYN) 500 MG tablet TAKE 1 TABLET BY MOUTH TWICE A DAY WITH MEALS 60 tablet 2     family history is not on file.  Social Connections: Not on file          WBC   Date Value Ref Range Status   10/23/2014 6.5 4.0 - 11.0 10e9/L Final     WBC Count   Date Value Ref Range Status   06/13/2023 6.9 4.0 - 11.0 10e3/uL Final     RBC Count   Date Value Ref Range Status   06/13/2023 5.06 4.40 - 5.90 10e6/uL Final   10/23/2014 4.70 4.4 - 5.9 10e12/L Final     Hemoglobin   Date Value Ref Range Status   06/13/2023 15.9 13.3 - 17.7 g/dL Final   10/23/2014 15.1 13.3 - 17.7 g/dL Final     Hematocrit   Date Value Ref Range Status   06/13/2023 45.7 40.0 - 53.0 % Final   10/23/2014 43.6 40.0 - 53.0 % Final     MCV   Date Value Ref Range Status   06/13/2023 90 78 - 100 fL Final   10/23/2014 93 78 - 100 fl Final     MCH   Date Value Ref Range Status   06/13/2023 31.4 26.5 - 33.0 pg Final   10/23/2014 32.1 26.5 - 33.0 pg Final     Platelet Count   Date Value Ref Range Status   06/13/2023 183 150 - 450 10e3/uL Final   10/23/2014 198 150 - 450 10e9/L Final     % Lymphocytes   Date Value Ref Range Status   09/09/2019 24 20 - 40 % Final   02/01/2012 20 20 - 48 %      AST   Date Value Ref Range Status   04/12/2023 27 10 - 50 U/L Final   10/23/2014 20 0 - 45 U/L Final     ALT   Date Value Ref Range Status   06/13/2023 24 0 - 70 U/L Final     Comment:     Reference intervals for this test were updated on 6/12/2023 to more accurately reflect our healthy population. There may be differences in the flagging of prior results with similar values performed with this method. Interpretation of those prior results can be made in the context of the updated reference intervals.     10/23/2014 29 0 -  70 U/L Final     Albumin   Date Value Ref Range Status   06/13/2023 4.0 3.5 - 5.2 g/dL Final   02/10/2022 3.8 3.5 - 5.0 g/dL Final   07/27/2009 4.2 3.9 - 5.1 g/dL Final     Alkaline Phosphatase   Date Value Ref Range Status   04/12/2023 63 40 - 129 U/L Final   03/27/2007 60 40 - 150 U/L Final     Creatinine   Date Value Ref Range Status   06/13/2023 0.81 0.67 - 1.17 mg/dL Final   10/23/2014 0.87 0.66 - 1.25 mg/dL Final     GFR Estimate   Date Value Ref Range Status   06/13/2023 >90 >60 mL/min/1.73m2 Final     Comment:     eGFR calculated using 2021 CKD-EPI equation.   04/21/2021 >60 >60 mL/min/1.73m2 Final   10/23/2014 >90  Non  GFR Calc   >60 mL/min/1.7m2 Final     GFR Estimate If Black   Date Value Ref Range Status   04/21/2021 >60 >60 mL/min/1.73m2 Final   10/23/2014 >90   GFR Calc   >60 mL/min/1.7m2 Final     Sed Rate   Date Value Ref Range Status   04/10/2013 13 0 - 15 mm/h Final     Erythrocyte Sedimentation Rate   Date Value Ref Range Status   06/13/2023 12 0 - 15 mm/hr Final     CRP Inflammation   Date Value Ref Range Status   10/23/2014 <2.9 0.0 - 8.0 mg/L Final     CRP   Date Value Ref Range Status   09/09/2019 5.2 (H) 0.0 - 0.8 mg/dL Final

## 2024-01-03 ENCOUNTER — PATIENT OUTREACH (OUTPATIENT)
Dept: GASTROENTEROLOGY | Facility: CLINIC | Age: 49
End: 2024-01-03
Payer: COMMERCIAL

## 2024-02-15 ENCOUNTER — TELEPHONE (OUTPATIENT)
Dept: RHEUMATOLOGY | Facility: CLINIC | Age: 49
End: 2024-02-15
Payer: COMMERCIAL

## 2024-02-15 NOTE — TELEPHONE ENCOUNTER
PA Initiation    Medication: HUMIRA *CF* PEN 40 MG/0.4ML SC PNKT  Insurance Company: [employer] Phone [phone] Fax [phone]  Pharmacy Filling the Rx: [facility] - [place] [address]  Filling Pharmacy Phone:    Filling Pharmacy Fax:    Start Date: 2/15/2024  (Key: [id number])    TAMARA Finnegan, [facility]  Specialty Pharmacy Clinic [facility]    [email or web address]     Phone: [phone]  Fax: [phone]        Prior Authorization Approval    Medication: HUMIRA *CF* PEN 40 MG/0.4ML SC PNKT  Authorization Effective Date: 2/15/2024  Authorization Expiration Date: 2/15/2025  Approved Dose/Quantity: 2  Reference #: (Key: [id number])   Insurance Company: [employer] Phone [phone] Fax [phone]  Expected CoPay: $    CoPay Card Available: No    Financial Assistance Needed: na  Which Pharmacy is filling the prescription: [facility] - [place] [address]

## 2024-02-16 NOTE — TELEPHONE ENCOUNTER
Prior Authorization Approval    Medication: HUMIRA *CF* PEN 40 MG/0.4ML SC PNKT  Authorization Effective Date: 2/15/2024  Authorization Expiration Date: 2/15/2025  Approved Dose/Quantity: 2 ech  Reference #: (Key: [id number])   Insurance Company: [employer] - Phone [phone] Fax [phone]  Expected CoPay: $    CoPay Card Available: No    Financial Assistance Needed: na  Which Pharmacy is filling the prescription: [facility] - [place], [place] - [address]

## 2024-03-13 ENCOUNTER — PATIENT OUTREACH (OUTPATIENT)
Dept: CARE COORDINATION | Facility: CLINIC | Age: 49
End: 2024-03-13
Payer: COMMERCIAL

## 2024-03-27 ENCOUNTER — PATIENT OUTREACH (OUTPATIENT)
Dept: CARE COORDINATION | Facility: CLINIC | Age: 49
End: 2024-03-27
Payer: COMMERCIAL

## 2024-05-15 DIAGNOSIS — Z00.00 ENCOUNTER FOR GENERAL ADULT MEDICAL EXAMINATION WITHOUT ABNORMAL FINDINGS: ICD-10-CM

## 2024-05-15 DIAGNOSIS — M45.5 ANKYLOSING SPONDYLITIS OF THORACOLUMBAR REGION (H): ICD-10-CM

## 2024-05-15 RX ORDER — NAPROXEN 500 MG/1
TABLET ORAL
Qty: 60 TABLET | Refills: 2 | Status: SHIPPED | OUTPATIENT
Start: 2024-05-15

## 2024-06-01 ENCOUNTER — HEALTH MAINTENANCE LETTER (OUTPATIENT)
Age: 49
End: 2024-06-01

## 2024-08-08 ENCOUNTER — TRANSFERRED RECORDS (OUTPATIENT)
Dept: HEALTH INFORMATION MANAGEMENT | Facility: CLINIC | Age: 49
End: 2024-08-08
Payer: COMMERCIAL

## 2024-09-23 ENCOUNTER — TELEPHONE (OUTPATIENT)
Dept: RHEUMATOLOGY | Facility: CLINIC | Age: 49
End: 2024-09-23
Payer: COMMERCIAL

## 2024-09-23 DIAGNOSIS — M45.8 ANKYLOSING SPONDYLITIS OF SACRAL REGION (H): ICD-10-CM

## 2024-10-02 ENCOUNTER — TELEPHONE (OUTPATIENT)
Dept: RHEUMATOLOGY | Facility: CLINIC | Age: 49
End: 2024-10-02
Payer: COMMERCIAL

## 2024-10-02 DIAGNOSIS — M45.8 ANKYLOSING SPONDYLITIS OF SACRAL REGION (H): ICD-10-CM

## 2024-10-04 NOTE — TELEPHONE ENCOUNTER
10.04- lmtcb x2-   Pt will need labs now, as appt is so far out. If no concerns okay to keep appt as is.

## 2024-10-07 ENCOUNTER — LAB (OUTPATIENT)
Dept: LAB | Facility: CLINIC | Age: 49
End: 2024-10-07
Payer: COMMERCIAL

## 2024-10-07 DIAGNOSIS — Z79.899 HIGH RISK MEDICATION USE: ICD-10-CM

## 2024-10-07 DIAGNOSIS — M45.8 ANKYLOSING SPONDYLITIS OF SACRAL REGION (H): ICD-10-CM

## 2024-10-07 LAB
ALBUMIN SERPL BCG-MCNC: 3.8 G/DL (ref 3.5–5.2)
ALT SERPL W P-5'-P-CCNC: 22 U/L (ref 0–70)
CREAT SERPL-MCNC: 1.06 MG/DL (ref 0.67–1.17)
EGFRCR SERPLBLD CKD-EPI 2021: 85 ML/MIN/1.73M2
ERYTHROCYTE [DISTWIDTH] IN BLOOD BY AUTOMATED COUNT: 11.4 % (ref 10–15)
ERYTHROCYTE [SEDIMENTATION RATE] IN BLOOD BY WESTERGREN METHOD: 49 MM/HR (ref 0–20)
HCT VFR BLD AUTO: 37.4 % (ref 40–53)
HGB BLD-MCNC: 13 G/DL (ref 13.3–17.7)
MCH RBC QN AUTO: 31.5 PG (ref 26.5–33)
MCHC RBC AUTO-ENTMCNC: 34.8 G/DL (ref 31.5–36.5)
MCV RBC AUTO: 91 FL (ref 78–100)
PLATELET # BLD AUTO: 207 10E3/UL (ref 150–450)
RBC # BLD AUTO: 4.13 10E6/UL (ref 4.4–5.9)
WBC # BLD AUTO: 4.2 10E3/UL (ref 4–11)

## 2024-10-07 PROCEDURE — 85652 RBC SED RATE AUTOMATED: CPT

## 2024-10-07 PROCEDURE — 82040 ASSAY OF SERUM ALBUMIN: CPT

## 2024-10-07 PROCEDURE — 85027 COMPLETE CBC AUTOMATED: CPT

## 2024-10-07 PROCEDURE — 86140 C-REACTIVE PROTEIN: CPT

## 2024-10-07 PROCEDURE — 82565 ASSAY OF CREATININE: CPT

## 2024-10-07 PROCEDURE — 84460 ALANINE AMINO (ALT) (SGPT): CPT

## 2024-10-07 PROCEDURE — 36415 COLL VENOUS BLD VENIPUNCTURE: CPT

## 2024-10-08 LAB — CRP SERPL-MCNC: 10.4 MG/L

## 2024-10-08 NOTE — TELEPHONE ENCOUNTER
[facility] states they haven't received a response regarding their refill request for *adalimumab (HUMIRA *CF* PEN) 40 MG/0.4ML pen kit.  Please re-send rx. Thank you.

## 2024-10-09 ENCOUNTER — PATIENT OUTREACH (OUTPATIENT)
Dept: CARE COORDINATION | Facility: CLINIC | Age: 49
End: 2024-10-09
Payer: COMMERCIAL

## 2024-10-10 ENCOUNTER — HOSPITAL ENCOUNTER (EMERGENCY)
Facility: CLINIC | Age: 49
Discharge: HOME | End: 2024-10-10
Attending: EMERGENCY MEDICINE | Admitting: EMERGENCY MEDICINE
Payer: COMMERCIAL

## 2024-10-10 ENCOUNTER — APPOINTMENT (OUTPATIENT)
Dept: RADIOLOGY | Facility: CLINIC | Age: 49
End: 2024-10-10
Attending: EMERGENCY MEDICINE
Payer: COMMERCIAL

## 2024-10-10 VITALS
BODY MASS INDEX: 27.02 KG/M2 | HEIGHT: 71 IN | SYSTOLIC BLOOD PRESSURE: 170 MMHG | OXYGEN SATURATION: 98 % | DIASTOLIC BLOOD PRESSURE: 104 MMHG | HEART RATE: 70 BPM | WEIGHT: 193 LBS | RESPIRATION RATE: 16 BRPM | TEMPERATURE: 98.7 F

## 2024-10-10 DIAGNOSIS — J18.0 BRONCHOPNEUMONIA: ICD-10-CM

## 2024-10-10 DIAGNOSIS — J45.20 MILD INTERMITTENT ASTHMA WITHOUT COMPLICATION: ICD-10-CM

## 2024-10-10 LAB
ANION GAP SERPL CALCULATED.3IONS-SCNC: 13 MMOL/L (ref 7–15)
BASOPHILS # BLD AUTO: 0 10E3/UL (ref 0–0.2)
BASOPHILS NFR BLD AUTO: 0 %
BUN SERPL-MCNC: 13.1 MG/DL (ref 6–20)
CALCIUM SERPL-MCNC: 8.4 MG/DL (ref 8.8–10.4)
CHLORIDE SERPL-SCNC: 104 MMOL/L (ref 98–107)
CREAT SERPL-MCNC: 0.91 MG/DL (ref 0.67–1.17)
CRP SERPL-MCNC: 9.98 MG/L
EGFRCR SERPLBLD CKD-EPI 2021: >90 ML/MIN/1.73M2
EOSINOPHIL # BLD AUTO: 0 10E3/UL (ref 0–0.7)
EOSINOPHIL NFR BLD AUTO: 1 %
ERYTHROCYTE [DISTWIDTH] IN BLOOD BY AUTOMATED COUNT: 11.6 % (ref 10–15)
FLUAV RNA SPEC QL NAA+PROBE: NEGATIVE
FLUBV RNA RESP QL NAA+PROBE: NEGATIVE
GLUCOSE SERPL-MCNC: 92 MG/DL (ref 70–99)
HCO3 SERPL-SCNC: 22 MMOL/L (ref 22–29)
HCT VFR BLD AUTO: 41.7 % (ref 40–53)
HGB BLD-MCNC: 14.1 G/DL (ref 13.3–17.7)
IMM GRANULOCYTES # BLD: 0 10E3/UL
IMM GRANULOCYTES NFR BLD: 0 %
LYMPHOCYTES # BLD AUTO: 3.1 10E3/UL (ref 0.8–5.3)
LYMPHOCYTES NFR BLD AUTO: 53 %
MCH RBC QN AUTO: 30.4 PG (ref 26.5–33)
MCHC RBC AUTO-ENTMCNC: 33.8 G/DL (ref 31.5–36.5)
MCV RBC AUTO: 90 FL (ref 78–100)
MONOCYTES # BLD AUTO: 0.4 10E3/UL (ref 0–1.3)
MONOCYTES NFR BLD AUTO: 7 %
NEUTROPHILS # BLD AUTO: 2.3 10E3/UL (ref 1.6–8.3)
NEUTROPHILS NFR BLD AUTO: 39 %
NRBC # BLD AUTO: 0 10E3/UL
NRBC BLD AUTO-RTO: 0 /100
PLATELET # BLD AUTO: 212 10E3/UL (ref 150–450)
POTASSIUM SERPL-SCNC: 4.6 MMOL/L (ref 3.4–5.3)
RBC # BLD AUTO: 4.64 10E6/UL (ref 4.4–5.9)
RSV RNA SPEC NAA+PROBE: NEGATIVE
SARS-COV-2 RNA RESP QL NAA+PROBE: NEGATIVE
SODIUM SERPL-SCNC: 139 MMOL/L (ref 135–145)
WBC # BLD AUTO: 5.9 10E3/UL (ref 4–11)

## 2024-10-10 PROCEDURE — 71046 X-RAY EXAM CHEST 2 VIEWS: CPT

## 2024-10-10 PROCEDURE — 36415 COLL VENOUS BLD VENIPUNCTURE: CPT | Performed by: EMERGENCY MEDICINE

## 2024-10-10 PROCEDURE — 87637 SARSCOV2&INF A&B&RSV AMP PRB: CPT | Performed by: EMERGENCY MEDICINE

## 2024-10-10 PROCEDURE — 82435 ASSAY OF BLOOD CHLORIDE: CPT | Performed by: EMERGENCY MEDICINE

## 2024-10-10 PROCEDURE — 99284 EMERGENCY DEPT VISIT MOD MDM: CPT | Mod: 25

## 2024-10-10 PROCEDURE — 80048 BASIC METABOLIC PNL TOTAL CA: CPT | Performed by: EMERGENCY MEDICINE

## 2024-10-10 PROCEDURE — 86140 C-REACTIVE PROTEIN: CPT | Performed by: EMERGENCY MEDICINE

## 2024-10-10 PROCEDURE — 85025 COMPLETE CBC W/AUTO DIFF WBC: CPT | Performed by: EMERGENCY MEDICINE

## 2024-10-10 RX ORDER — CEFDINIR 300 MG/1
300 CAPSULE ORAL 2 TIMES DAILY
Qty: 14 CAPSULE | Refills: 0 | Status: SHIPPED | OUTPATIENT
Start: 2024-10-10 | End: 2024-10-17

## 2024-10-10 RX ORDER — ALBUTEROL SULFATE 90 UG/1
2 INHALANT RESPIRATORY (INHALATION) EVERY 6 HOURS PRN
Qty: 18 G | Refills: 0 | Status: SHIPPED | OUTPATIENT
Start: 2024-10-10

## 2024-10-10 RX ORDER — AZITHROMYCIN 250 MG/1
TABLET, FILM COATED ORAL
Qty: 6 TABLET | Refills: 0 | Status: SHIPPED | OUTPATIENT
Start: 2024-10-10

## 2024-10-10 ASSESSMENT — COLUMBIA-SUICIDE SEVERITY RATING SCALE - C-SSRS
6. HAVE YOU EVER DONE ANYTHING, STARTED TO DO ANYTHING, OR PREPARED TO DO ANYTHING TO END YOUR LIFE?: NO
2. HAVE YOU ACTUALLY HAD ANY THOUGHTS OF KILLING YOURSELF IN THE PAST MONTH?: NO
1. IN THE PAST MONTH, HAVE YOU WISHED YOU WERE DEAD OR WISHED YOU COULD GO TO SLEEP AND NOT WAKE UP?: NO

## 2024-10-10 ASSESSMENT — ACTIVITIES OF DAILY LIVING (ADL)
ADLS_ACUITY_SCORE: 35
ADLS_ACUITY_SCORE: 35

## 2024-10-10 NOTE — DISCHARGE INSTRUCTIONS
You were seen in the emergency department for cough and fever.  Your evaluation included an x-ray which suggested a bronchitis and possibly an early pneumonia.  We discussed that we are recommending a couple of antibiotics, please take these as prescribed.  You can also use your inhaler as needed for coughing fits or shortness of breath.  You can use Tylenol 650 mg and ibuprofen 400 mg every 6 hours as needed for pain or fever control.  Make sure you are drinking plenty of liquids to stay well-hydrated.  Please return to the emergency department if you have severe shortness of breath at rest or a large increase in the amount of blood that you are coughing up.  If symptoms are not better within the next 1 to 2 weeks we would suggest follow-up in your clinic.

## 2024-10-10 NOTE — ED PROVIDER NOTES
EMERGENCY DEPARTMENT ENCOUNTER      NAME: Jesus Olivares  AGE: 49 year old male  YOB: 1975  MRN: [id number]  EVALUATION DATE & TIME: 10/10/2024  7:54 AM    PCP: [facility]    ED PROVIDER: Ronen Perez M.D.      Chief Complaint   Patient presents with    Flu Symptoms         FINAL IMPRESSION:  1. Bronchopneumonia    2. Mild intermittent asthma without complication          ED COURSE & MEDICAL DECISION MAKIN:58 AM I met with the patient, obtained history, performed an initial exam, and discussed options and plan for diagnostics and treatment here in the ED.     49 year old male presents to the Emergency Department for evaluation of cough.  Patient has been sick for the last 10 days with bodyaches chills and cough.  For the last 5 days the cough has been productive of blood-tinged phlegm.  Patient arrives to the emergency department moderately hypertensive but otherwise vitally stable.  He is not in any respiratory distress.  Pulmonary exam is stable with no asymmetric lung sounds.  Lab and imaging evaluation was completed as below.  This revealed a normal white blood cell count, mildly elevated CRP, negative viral testing for COVID and influenza.  Chest x-ray suggestive of bronchitis or possible early bronchopneumonia I think this fits with the patient's clinical history.  Patient absence of chest pain, hypoxia, tachycardia, and with small-volume hemoptysis and a clear upper respiratory pattern of illness I have no suspicion for pulmonary embolism requiring additional testing.  Patient was in agreement with a treatment course of antibiotics.  He also has a history of possible asthma and I am going to refill his albuterol inhaler.  Continue clinic follow-up as recommended after this ED visit.  Return precautions reviewed.    At the conclusion of the encounter I discussed the results of all of the tests and the disposition. The questions were answered. The patient or family  acknowledged understanding and was agreeable with the care plan.       Medical Decision Making  Obtained supplemental history:Supplemental history obtained?: No  Reviewed external records: External records reviewed?: No  Care impacted by chronic illness:Documented in Chart  Did you consider but not order tests?: Work up considered but not performed and documented in chart, if applicable  Did you interpret images independently?: Independent interpretation of ECG and images noted in documentation, when applicable.  Consultation discussion with other provider:Did you involve another provider (consultant, , pharmacy, etc.)?: No  Discharge. I prescribed additional prescription strength medication(s) as charted. N/A.    MIPS: Not Applicable          MEDICATIONS GIVEN IN THE EMERGENCY:  Medications - No data to display    NEW PRESCRIPTIONS STARTED AT TODAY'S ER VISIT  Discharge Medication List as of 10/10/2024  9:22 AM        START taking these medications    Details   azithromycin (ZITHROMAX Z-COREY) 250 MG tablet Two tablets on the first day, then one tablet daily for the next 4 days, Disp-6 tablet, R-0, Local Print      cefdinir (OMNICEF) 300 MG capsule Take 1 capsule (300 mg) by mouth 2 times daily for 7 days., Disp-14 capsule, R-0, Local Print                =================================================================    HPI    Patient information was obtained from: patient     Use of : N/A         Jesus Olivares is a 49 year old male with a pertinent history of asthma who presents to this ED via walk-in for evaluation of cough.    The patient presents with 10 days of cough that became productive 5 days ago. There is a small volume of bloody in the sputum. He currently has a subjective fever and chills. He has a history of asthma but does not use inhalers or medications for it. Of note, he was recently in [place] but denies any known sick contacts.     He denies chest pain, leg pain, leg swelling,  or any other complaints at this time.       REVIEW OF SYSTEMS   All systems reviewed and negative except as noted in HPI.    PAST MEDICAL HISTORY:  Past Medical History:   Diagnosis Date    Hypertension        PAST SURGICAL HISTORY:  History reviewed. No pertinent surgical history.        CURRENT MEDICATIONS:    No current facility-administered medications for this encounter.     Current Outpatient Medications   Medication Sig Dispense Refill    albuterol (PROAIR HFA/PROVENTIL HFA/VENTOLIN HFA) 108 (90 Base) MCG/ACT inhaler Inhale 2 puffs into the lungs every 6 hours as needed for shortness of breath, wheezing or cough. 18 g 0    azithromycin (ZITHROMAX Z-COREY) 250 MG tablet Two tablets on the first day, then one tablet daily for the next 4 days 6 tablet 0    cefdinir (OMNICEF) 300 MG capsule Take 1 capsule (300 mg) by mouth 2 times daily for 7 days. 14 capsule 0    ACETAMINOPHEN 500 MG OR TABS 2 tablets as needed      Carboxymethylcellul-Glycerin (CLEAR EYES FOR DRY EYES OP) Apply 1 drop to eye daily as needed      HUMIRA *CF* PEN 40 MG/0.4ML pen kit INJECT THE CONTENTS OF 1 AUTOINJECTOR PEN UNDER THE SKIN EVERY OTHER WEEK. HOLD FOR SIGNS OF INFECTION, THEN SEEK MEDICAL ATTENTION. 2 each 2    naproxen (NAPROSYN) 500 MG tablet TAKE 1 TABLET BY MOUTH TWICE A DAY WITH FOOD 60 tablet 2         ALLERGIES:  No Known Allergies    FAMILY HISTORY:  Family History   Problem Relation Age of Onset    Glaucoma No family hx of     Macular Degeneration No family hx of        SOCIAL HISTORY:   Social History     Socioeconomic History    Marital status:    Tobacco Use    Smoking status: Never    Smokeless tobacco: Never   Substance and Sexual Activity    Alcohol use: Yes     Comment: social not very often    Drug use: No    Sexual activity: Yes     Partners: Female     Comment: wife   Social History Narrative    wife- santa    no children    live in [place]    works [employer]    Met wife through  "[employer].      He was born in [place] but adopted in the [place]       VITALS:  BP (!) 170/104   Pulse 70   Temp 98.7  F (37.1  C) (Oral)   Resp 16   Ht 1.803 m (5' 11\")   Wt 87.5 kg (193 lb)   SpO2 98%   BMI 26.92 kg/m      PHYSICAL EXAM    Constitutional: Well developed, Well nourished, NAD.  HENT: Normocephalic, Atraumatic. Neck Supple.  Eyes: EOMI, Conjunctiva normal.  Respiratory: Breathing comfortably on room air. Speaks full sentences easily. Lungs clear to ascultation.  Cardiovascular: Normal heart rate, Regular rhythm. No peripheral edema.  Abdomen: Soft, nontender  Musculoskeletal: Good range of motion in all major joints. No major deformities noted.  Integument: Warm, Dry.  Neurologic: Alert & awake, Normal motor function, Normal sensory function, No focal deficits noted.   Psychiatric: Cooperative. Affect appropriate.     LAB:  All pertinent labs reviewed and interpreted.  Labs Ordered and Resulted from Time of ED Arrival to Time of ED Departure   BASIC METABOLIC PANEL - Abnormal       Result Value    Sodium 139      Potassium 4.6      Chloride 104      Carbon Dioxide (CO2) 22      Anion Gap 13      Urea Nitrogen 13.1      Creatinine 0.91      GFR Estimate >90      Calcium 8.4 (*)     Glucose 92     CRP INFLAMMATION - Abnormal    CRP Inflammation 9.98 (*)    INFLUENZA A/B, RSV, & SARS-COV2 PCR - Normal    Influenza A PCR Negative      Influenza B PCR Negative      RSV PCR Negative      SARS CoV2 PCR Negative     CBC WITH PLATELETS AND DIFFERENTIAL    WBC Count 5.9      RBC Count 4.64      Hemoglobin 14.1      Hematocrit 41.7      MCV 90      MCH 30.4      MCHC 33.8      RDW 11.6      Platelet Count 212      % Neutrophils 39      % Lymphocytes 53      % Monocytes 7      % Eosinophils 1      % Basophils 0      % Immature Granulocytes 0      NRBCs per 100 WBC 0      Absolute Neutrophils 2.3      Absolute Lymphocytes 3.1      Absolute Monocytes 0.4      Absolute Eosinophils 0.0      Absolute Basophils " 0.0      Absolute Immature Granulocytes 0.0      Absolute NRBCs 0.0         RADIOLOGY:  Reviewed all pertinent imaging. Please see official radiology report.  Chest XR,  PA & LAT   Final Result   IMPRESSION: There is mild peribronchial thickening and scattered, faint peribronchiolar opacities in both lower lobes, left greater than right. Findings suggestive of a bronchitis/early bronchopneumonia. No effusions. Heart and pulmonary vascularity are    normal.            I, Luis Lynch, am serving as a scribe to document services personally performed by Dr. Ronen Perez, based on my observation and the provider's statements to me. I, Ronen Perez MD attest that Luis Lynch is acting in a scribe capacity, has observed my performance of the services and has documented them in accordance with my direction.    Ronen Perez M.D.  Emergency Medicine  [facility] EMERGENCY ROOM  [address] [address]  [phone]  Dept: [phone]       Ronen Perez MD  10/10/24 1037

## 2024-10-10 NOTE — ED TRIAGE NOTES
Pt c/o fever, body aches, cough, and sore throat x10 days. States cough is productive and he has had blood in his sputum. Recent travel to [facility] symptoms started soon after arriving back. Denies SOB or CP. Has been taking tylenol and cough syrup with some relief.     Triage Assessment (Adult)       Row Name 10/10/24 0758          Triage Assessment    Airway WDL WDL        Respiratory WDL    Respiratory WDL cough;X     Cough Type productive        Skin Circulation/Temperature WDL    Skin Circulation/Temperature WDL WDL        Cardiac WDL    Cardiac WDL X  HTN        Peripheral/Neurovascular WDL    Peripheral Neurovascular WDL WDL        Cognitive/Neuro/Behavioral WDL    Cognitive/Neuro/Behavioral WDL WDL

## 2024-10-10 NOTE — ED NOTES
Patient verbalized understanding of discharge instructions including medication administration and recommended follow up care as noted on discharge instructions.  Written discharge instructions given, denies any further questions.  Prescriptions: were printed and sent with patient.  Barriers to learning identified and addressed:  None observed. See providers notes for further assessment.

## 2024-10-14 ENCOUNTER — PATIENT OUTREACH (OUTPATIENT)
Dept: FAMILY MEDICINE | Facility: CLINIC | Age: 49
End: 2024-10-14
Payer: COMMERCIAL

## 2024-10-14 NOTE — TELEPHONE ENCOUNTER
Writer called patient and left message to return call to clinic.     If patient returns call please route to nurse queue to complete TCM hospital follow up questionnaire, medication reconciliation and assist with scheduling a hospital follow up visit..    MARIEL Nicole, RN  [facility]

## 2024-10-15 NOTE — TELEPHONE ENCOUNTER
Transitions of Care Outreach  Chief Complaint   Patient presents with    Hospital F/U       Most Recent Admission Date: 10/10/2024   Most Recent Admission Diagnosis:      Most Recent Discharge Date: 10/10/2024   Most Recent Discharge Diagnosis: Bronchopneumonia - J18.0  Mild intermittent asthma without complication - J45.20     Transitions of Care Assessment    Discharge Assessment  How are you doing now that you are home?: Patient is starting to feel better  How are your symptoms? (Red Flag symptoms escalate to triage hotline per guidelines): Improved  Do you know how to contact your clinic care team if you have future questions or changes to your health status? : Yes  Does the patient have their discharge instructions? : Yes  Does the patient have questions regarding their discharge instructions? : No  Were you started on any new medications or were there changes to any of your previous medications? : Yes  Does the patient have all of their medications?: Yes  Do you have questions regarding any of your medications? : No    Follow up Plan          Future Appointments   Date Time Provider Department Center   1/13/2025  8:15 AM Néstor Cobos [facility]       Outpatient Plan as outlined on AVS reviewed with patient.    For any urgent concerns, please contact our 24 hour nurse triage line: [phone] ([phone])       Gale Oseguera RN

## 2024-10-22 ENCOUNTER — OFFICE VISIT (OUTPATIENT)
Dept: FAMILY MEDICINE | Facility: CLINIC | Age: 49
End: 2024-10-22
Payer: COMMERCIAL

## 2024-10-22 VITALS
HEIGHT: 71 IN | DIASTOLIC BLOOD PRESSURE: 80 MMHG | OXYGEN SATURATION: 97 % | HEART RATE: 81 BPM | WEIGHT: 192.5 LBS | BODY MASS INDEX: 26.95 KG/M2 | RESPIRATION RATE: 20 BRPM | TEMPERATURE: 97.6 F | SYSTOLIC BLOOD PRESSURE: 140 MMHG

## 2024-10-22 DIAGNOSIS — J45.21 MILD INTERMITTENT ASTHMA WITH ACUTE EXACERBATION: Primary | ICD-10-CM

## 2024-10-22 DIAGNOSIS — J18.9 PNEUMONIA DUE TO INFECTIOUS ORGANISM, UNSPECIFIED LATERALITY, UNSPECIFIED PART OF LUNG: ICD-10-CM

## 2024-10-22 PROCEDURE — 99214 OFFICE O/P EST MOD 30 MIN: CPT | Performed by: STUDENT IN AN ORGANIZED HEALTH CARE EDUCATION/TRAINING PROGRAM

## 2024-10-22 RX ORDER — PREDNISONE 50 MG/1
50 TABLET ORAL DAILY
Qty: 5 TABLET | Refills: 0 | Status: SHIPPED | OUTPATIENT
Start: 2024-10-22 | End: 2024-10-27

## 2024-10-22 ASSESSMENT — ASTHMA QUESTIONNAIRES
ACT_TOTALSCORE: 15
QUESTION_2 LAST FOUR WEEKS HOW OFTEN HAVE YOU HAD SHORTNESS OF BREATH: ONCE OR TWICE A WEEK
QUESTION_5 LAST FOUR WEEKS HOW WOULD YOU RATE YOUR ASTHMA CONTROL: POORLY CONTROLLED
QUESTION_3 LAST FOUR WEEKS HOW OFTEN DID YOUR ASTHMA SYMPTOMS (WHEEZING, COUGHING, SHORTNESS OF BREATH, CHEST TIGHTNESS OR PAIN) WAKE YOU UP AT NIGHT OR EARLIER THAN USUAL IN THE MORNING: TWO OR THREE NIGHTS A WEEK
QUESTION_4 LAST FOUR WEEKS HOW OFTEN HAVE YOU USED YOUR RESCUE INHALER OR NEBULIZER MEDICATION (SUCH AS ALBUTEROL): ONE OR TWO TIMES PER DAY
QUESTION_1 LAST FOUR WEEKS HOW MUCH OF THE TIME DID YOUR ASTHMA KEEP YOU FROM GETTING AS MUCH DONE AT WORK, SCHOOL OR AT HOME: NONE OF THE TIME
ACT_TOTALSCORE: 15

## 2024-10-22 ASSESSMENT — PAIN SCALES - GENERAL: PAINLEVEL: NO PAIN (0)

## 2024-10-22 NOTE — PROGRESS NOTES
ER Follow-up Visit      Assessment and Plan     49-year-old male who presents in follow-up regarding ER visit diagnosed with pneumonia and asthma exacerbation.  Today he is mostly improved with some lingering cough and mild wheezing.  I think this is likely his asthma and I recommended trial of prednisone burst with continuous albuterol as needed.  If he ever develops more concerning symptoms such as hemoptysis or fevers again would be suspicious for nonresolving pneumonia and placed him on antibiotics likely something like Levaquin.    1. Mild intermittent asthma with acute exacerbation  - predniSONE (DELTASONE) 50 MG tablet; Take 1 tablet (50 mg) by mouth daily for 5 days.  Dispense: 5 tablet; Refill: 0    2. Pneumonia due to infectious organism, unspecified laterality, unspecified part of lung    Options for treatment and follow-up care were reviewed with the patient engaged in the decision making process and verbalized understanding of the options discussed and agreed with the final plan.      Tod Landry MD           [facility] [place]       ER Follow-up Visit:    ER:  [facility]    Date of Visit:  10/10/24    Reason(s) for Presentation:  Cough    Diagnostic Tests:      EXAM: XR CHEST 2 VIEWS  LOCATION: [facility]  DATE: 10/10/2024     INDICATION: Cough, fever, small volume hemoptysis  COMPARISON: 1/2/2013                                                                      IMPRESSION: There is mild peribronchial thickening and scattered, faint peribronchiolar opacities in both lower lobes, left greater than right. Findings suggestive of a bronchitis/early bronchopneumonia. No effusions. Heart and pulmonary vascularity are   normal.    Summary of ER visit copied below/Treatments Recieved:    7:58 AM I met with the patient, obtained history, performed an initial exam, and discussed options and plan for diagnostics and treatment here in the ED.      49 year old male presents to the Emergency  "Department for evaluation of cough.  Patient has been sick for the last 10 days with bodyaches chills and cough.  For the last 5 days the cough has been productive of blood-tinged phlegm.  Patient arrives to the emergency department moderately hypertensive but otherwise vitally stable.  He is not in any respiratory distress.  Pulmonary exam is stable with no asymmetric lung sounds.  Lab and imaging evaluation was completed as below.  This revealed a normal white blood cell count, mildly elevated CRP, negative viral testing for COVID and influenza.  Chest x-ray suggestive of bronchitis or possible early bronchopneumonia I think this fits with the patient's clinical history.  Patient absence of chest pain, hypoxia, tachycardia, and with small-volume hemoptysis and a clear upper respiratory pattern of illness I have no suspicion for pulmonary embolism requiring additional testing.  Patient was in agreement with a treatment course of antibiotics.  He also has a history of possible asthma and I am going to refill his albuterol inhaler.  Continue clinic follow-up as recommended after this ED visit.  Return precautions reviewed.    Concerns today:     Chief Complaint   Patient presents with    hospital follow up      Follow up bronchitis and pneumonia     Arthritis     Spine arthritis         Improved-still having some residual coughing and mild wheezing.  Using his albuterol inhaler approximate twice a day but does not feel like this improves his symptoms much.  He has had similar symptoms in the past with illnesses.  He has gotten prednisone before for asthma exacerbations with this and has felt improved.  He denies fevers and has had resumption of his hemoptysis.                Review of Systems:     Complete review of systems is negative except as noted in the HPI            Physical Exam:   BP (!) 140/80   Pulse 81   Temp 97.6  F (36.4  C)   Resp 20   Ht 1.804 m (5' 11.04\")   Wt 87.3 kg (192 lb 8 oz)   SpO2 97%  "  BMI 26.82 kg/m      General appearance: Alert, cooperative, no distress, appears stated age  Head: Normocephalic, atraumatic, without obvious abnormality  Eyes: Pupils equal round, reactive.  Conjunctiva clear.  Nose: Nares normal, no drainage.  Throat: Lips, mucosa, tongue normal mucosa pink and moist  Neck: Supple, symmetric, trachea midline  Lungs: Clear to auscultation bilaterally, no wheezing or crackles present.  Respirations unlabored  Heart: Regular rate and rhythm, normal S1 and S2, no murmur, rub or gallop.

## 2024-11-01 ENCOUNTER — TRANSFERRED RECORDS (OUTPATIENT)
Dept: HEALTH INFORMATION MANAGEMENT | Facility: CLINIC | Age: 49
End: 2024-11-01
Payer: COMMERCIAL

## 2024-11-09 DIAGNOSIS — Z00.00 ENCOUNTER FOR GENERAL ADULT MEDICAL EXAMINATION WITHOUT ABNORMAL FINDINGS: ICD-10-CM

## 2024-11-09 DIAGNOSIS — M45.5 ANKYLOSING SPONDYLITIS OF THORACOLUMBAR REGION (H): ICD-10-CM

## 2024-11-11 RX ORDER — NAPROXEN 500 MG/1
TABLET ORAL
Qty: 60 TABLET | Refills: 2 | Status: SHIPPED | OUTPATIENT
Start: 2024-11-11

## 2025-01-09 DIAGNOSIS — M45.8 ANKYLOSING SPONDYLITIS OF SACRAL REGION (H): Primary | ICD-10-CM

## 2025-01-09 RX ORDER — ADALIMUMAB 40MG/0.4ML
KIT SUBCUTANEOUS
Qty: 2 EACH | Refills: 0 | Status: SHIPPED | OUTPATIENT
Start: 2025-01-09

## 2025-01-13 ENCOUNTER — HOSPITAL ENCOUNTER (OUTPATIENT)
Dept: GENERAL RADIOLOGY | Facility: HOSPITAL | Age: 50
Discharge: HOME | End: 2025-01-13
Attending: INTERNAL MEDICINE | Admitting: INTERNAL MEDICINE
Payer: COMMERCIAL

## 2025-01-13 ENCOUNTER — OFFICE VISIT (OUTPATIENT)
Dept: RHEUMATOLOGY | Facility: CLINIC | Age: 50
End: 2025-01-13
Payer: COMMERCIAL

## 2025-01-13 VITALS
DIASTOLIC BLOOD PRESSURE: 96 MMHG | SYSTOLIC BLOOD PRESSURE: 156 MMHG | BODY MASS INDEX: 28.38 KG/M2 | HEART RATE: 67 BPM | OXYGEN SATURATION: 97 % | WEIGHT: 203.7 LBS

## 2025-01-13 DIAGNOSIS — M25.511 CHRONIC PAIN OF BOTH SHOULDERS: ICD-10-CM

## 2025-01-13 DIAGNOSIS — M45.8 ANKYLOSING SPONDYLITIS OF SACRAL REGION (H): ICD-10-CM

## 2025-01-13 DIAGNOSIS — G89.29 CHRONIC PAIN OF BOTH SHOULDERS: ICD-10-CM

## 2025-01-13 DIAGNOSIS — M25.512 CHRONIC PAIN OF BOTH SHOULDERS: ICD-10-CM

## 2025-01-13 DIAGNOSIS — Z96.643 HISTORY OF BILATERAL HIP ARTHROPLASTY: ICD-10-CM

## 2025-01-13 DIAGNOSIS — M45.8 ANKYLOSING SPONDYLITIS OF SACRAL REGION (H): Primary | ICD-10-CM

## 2025-01-13 DIAGNOSIS — Z79.899 HIGH RISK MEDICATION USE: ICD-10-CM

## 2025-01-13 DIAGNOSIS — M47.816 LUMBAR SPONDYLOSIS: ICD-10-CM

## 2025-01-13 PROCEDURE — G2211 COMPLEX E/M VISIT ADD ON: HCPCS | Performed by: INTERNAL MEDICINE

## 2025-01-13 PROCEDURE — 73030 X-RAY EXAM OF SHOULDER: CPT | Mod: 50

## 2025-01-13 PROCEDURE — 99214 OFFICE O/P EST MOD 30 MIN: CPT | Performed by: INTERNAL MEDICINE

## 2025-01-13 RX ORDER — ADALIMUMAB 40MG/0.4ML
40 KIT SUBCUTANEOUS
Qty: 0.8 ML | Refills: 5 | Status: SHIPPED | OUTPATIENT
Start: 2025-01-13 | End: 2025-02-12

## 2025-01-13 NOTE — PROGRESS NOTES
"      Rheumatology follow-up visit note     Jesus is a 49 year old male presents today for follow-up.    Jesus was seen today for recheck.    Diagnoses and all orders for this visit:    Ankylosing spondylitis of sacral region (H)  -     XR Shoulder Bilateral 3 Views; Future  -     Adalimumab (HUMIRA, 2 PEN,) 40 MG/0.4ML pen kit; Inject 0.4 mLs (40 mg) subcutaneously every 14 days.  -     Med Therapy Management Referral    High risk medication use  -     Med Therapy Management Referral    Lumbar spondylosis    History of bilateral hip arthroplasty    Chronic pain of both shoulders  -     XR Shoulder Bilateral 3 Views; Future        The longitudinal plan of care for the diagnosis(es)/condition(s) as documented were addressed during this visit. Due to the added complexity in care, I will continue to support Jesus in the subsequent management and with ongoing continuity of care.      Follow up in 6 weeks.    HPI    Jesus Olivares is a 49 year old male is here for follow-up of   Ankylosing spondylitis predominantly of the lumbosacral region, he is now on Humira.  He feels that has been helping.  He is hurting more in the shoulders than before and he wonders if the spondylitis has \"spread into his shoulders\".  The pain is there all the time during the day and night.  There is no history of fall.  He rated the pain as moderately severe to severe.  He has recently been to orthopedics for consideration for repeat arthroplasty of the left hip and was told that it might be something that takes place towards the latter part of this year.  He also takes Tylenol on a as needed basis typically once a day.      Following is the excerpt from a previous note:  He is on naproxen 500 twice daily but takes it only once a day..  There was lack of access in part secondary to financial barriers to Enbrel, changed to Humira, he feels the Humira is not as effective as Enbrel though he noted 70% improvement, the last few days of the " cycle he has more stiffness, he takes naproxen every day once a day.  He is going to take naproxen 2 times a day during the latter half of the 2-week cycle of Humira.  While he was on Enbrel he was doing so much better but now for the past 1 month he has not been able to get it because it caused him $1300 as he ran out of the money on the Enbrel co-pay card which evidently had lasted him the whole year last year.  He has noted prolonged stiffness in the lower back, pain in her shoulders bilaterally.  There is been no swelling.  No features suggestive of iritis.  His asdas score is calculated as below.  . Prior to Enbrel he was on Cosentyx. He had felt that Enbrel that provided him with better relief. He is status post bilateral hip arthroplasty.           DETAILED EXAMINATION  01/13/25  :    Vitals:    01/13/25 0814 01/13/25 0817   BP: (!) 148/84 (!) 152/101   BP Location: Right arm Left arm   Pulse: 67    SpO2: 97%    Weight: 92.4 kg (203 lb 11.2 oz)      Alert oriented. Head including the face is examined for malar rash, heliotropes, scarring, lupus pernio. Eyes examined for redness such as in episcleritis/scleritis, periorbital lesions.   Neck/ Face examined for parotid gland swelling, range of motion of neck.  Left upper and lower and right upper and lower extremities examined for tenderness, swelling, warmth of the appendicular joints, range of motion, edema, rash.  Some of the important findings included: he does not have evidence of synovitis in the palpable joints of the upper extremities.  No significant deformities of the digits.  no Heberden nodes.   She has discomfort at the shoulders bilaterally and at the abduction approximately at 1 6 0 degrees.  He has full internal rotation.  Patient Active Problem List    Diagnosis Date Noted    History of colonic polyps 11/02/2023     Priority: Medium     Sessile polyp descending colon, tubular adenoma by biopsy      Erectile dysfunction due to arterial  "insufficiency 04/12/2023     Priority: Medium    History of repair of hip joint 01/06/2020     Priority: Medium     Total hip replacement on both sides      Hemorrhoids 06/17/2019     Priority: Medium     hemorrhoidectomy      Healthcare maintenance 01/09/2014     Priority: Medium     Colonoscopy:has never had- referred 4/23  PSA:  Prostate Specific Antigen Screen   Date Value Ref Range Status   04/12/2023 0.79 0.00 - 2.50 ng/mL Final     4/23:  The 10-year ASCVD risk score (Joy SAVAGE, et al., 2019) is: 3.7%    Values used to calculate the score:      Age: 48 years      Sex: Male      Is Non- : No      Diabetic: No      Tobacco smoker: No      Systolic Blood Pressure: 120 mmHg      Is BP treated: No      HDL Cholesterol: 38 mg/dL      Total Cholesterol: 199 mg/dL      Allergic rhinitis 09/01/2013     Priority: Medium    Mild intermittent asthma 09/01/2013     Priority: Medium    Ankylosing spondylitis (H) 01/15/2008     Priority: Medium     Last rheumatology appointment 2/22    Jesus is a 46 year old male presents today for follow-up.     Jesus was seen today for recheck.     Diagnoses and all orders for this visit:     Ankylosing spondylitis of sacral region (H)  -     etanercept (ENBREL SURECLICK) 50 MG/ML autoinjector; INJECT THE CONTENTS OF ONE PEN UNDER THE SKIN EVERY SEVEN DAYS     Lumbar spondylosis     History of bilateral hip arthroplasty     High risk medication use  -     Albumin level; Standing  -     ALT; Standing  -     CBC with platelets; Standing  -     Creatinine; Standing           This patient's ankylosing spondylitis symptoms are better with Enbrel, since he switched from Cosentyx.  He continues to have what he describes as \"hip pain\" pointing to the lateral trochanteric area.  He is status post bilateral hip arthroplasty several years ago.  He is planning to go see the orthopedics in the near future.  We will meet here in 6 months labs today continue Enbrel as now.   "     No past surgical history on file.   Past Medical History:   Diagnosis Date    Hypertension      No Known Allergies  Current Outpatient Medications   Medication Sig Dispense Refill    ACETAMINOPHEN 500 MG OR TABS 2 tablets as needed      albuterol (PROAIR HFA/PROVENTIL HFA/VENTOLIN HFA) 108 (90 Base) MCG/ACT inhaler Inhale 2 puffs into the lungs every 6 hours as needed for shortness of breath, wheezing or cough. 18 g 0    azithromycin (ZITHROMAX Z-COREY) 250 MG tablet Two tablets on the first day, then one tablet daily for the next 4 days 6 tablet 0    Carboxymethylcellul-Glycerin (CLEAR EYES FOR DRY EYES OP) Apply 1 drop to eye daily as needed      HUMIRA *CF* PEN 40 MG/0.4ML pen kit INJECT THE CONTENTS OF 1 AUTOINJECTOR PEN UNDER THE SKIN EVERY OTHER WEEK. HOLD FOR SIGNS OF INFECTION, THEN SEEK MEDICAL ATTENTION. 2 each 2    HUMIRA, 2 PEN, 40 MG/0.4ML pen kit INJECT THE CONTENTS OF 1 AUTOINJECTOR PEN UNDER THE SKIN EVERY OTHER WEEK. HOLD FOR SIGNS OF INFECTION, THEN SEEK MEDICAL ATTENTION. 2 each 0    naproxen (NAPROSYN) 500 MG tablet TAKE 1 TABLET BY MOUTH TWICE A DAY WITH FOOD 60 tablet 2     family history is not on file.  Social Connections: Not on file          WBC   Date Value Ref Range Status   10/23/2014 6.5 4.0 - 11.0 10e9/L Final     WBC Count   Date Value Ref Range Status   10/10/2024 5.9 4.0 - 11.0 10e3/uL Final     RBC Count   Date Value Ref Range Status   10/10/2024 4.64 4.40 - 5.90 10e6/uL Final   10/23/2014 4.70 4.4 - 5.9 10e12/L Final     Hemoglobin   Date Value Ref Range Status   10/10/2024 14.1 13.3 - 17.7 g/dL Final   10/23/2014 15.1 13.3 - 17.7 g/dL Final     Hematocrit   Date Value Ref Range Status   10/10/2024 41.7 40.0 - 53.0 % Final   10/23/2014 43.6 40.0 - 53.0 % Final     MCV   Date Value Ref Range Status   10/10/2024 90 78 - 100 fL Final   10/23/2014 93 78 - 100 fl Final     MCH   Date Value Ref Range Status   10/10/2024 30.4 26.5 - 33.0 pg Final   10/23/2014 32.1 26.5 - 33.0 pg Final      Platelet Count   Date Value Ref Range Status   10/10/2024 212 150 - 450 10e3/uL Final   10/23/2014 198 150 - 450 10e9/L Final     % Lymphocytes   Date Value Ref Range Status   10/10/2024 53 % Final   02/01/2012 20 20 - 48 %      AST   Date Value Ref Range Status   04/12/2023 27 10 - 50 U/L Final   10/23/2014 20 0 - 45 U/L Final     ALT   Date Value Ref Range Status   10/07/2024 22 0 - 70 U/L Final   10/23/2014 29 0 - 70 U/L Final     Albumin   Date Value Ref Range Status   10/07/2024 3.8 3.5 - 5.2 g/dL Final   02/10/2022 3.8 3.5 - 5.0 g/dL Final   07/27/2009 4.2 3.9 - 5.1 g/dL Final     Alkaline Phosphatase   Date Value Ref Range Status   04/12/2023 63 40 - 129 U/L Final   03/27/2007 60 40 - 150 U/L Final     Creatinine   Date Value Ref Range Status   10/10/2024 0.91 0.67 - 1.17 mg/dL Final   10/23/2014 0.87 0.66 - 1.25 mg/dL Final     GFR Estimate   Date Value Ref Range Status   10/10/2024 >90 >60 mL/min/1.73m2 Final     Comment:     eGFR calculated using 2021 CKD-EPI equation.   04/21/2021 >60 >60 mL/min/1.73m2 Final   10/23/2014 >90  Non  GFR Calc   >60 mL/min/1.7m2 Final     GFR Estimate If Black   Date Value Ref Range Status   04/21/2021 >60 >60 mL/min/1.73m2 Final   10/23/2014 >90   GFR Calc   >60 mL/min/1.7m2 Final     Sed Rate   Date Value Ref Range Status   04/10/2013 13 0 - 15 mm/h Final     Erythrocyte Sedimentation Rate   Date Value Ref Range Status   10/07/2024 49 (H) 0 - 20 mm/hr Final     CRP Inflammation   Date Value Ref Range Status   10/23/2014 <2.9 0.0 - 8.0 mg/L Final     CRP   Date Value Ref Range Status   09/09/2019 5.2 (H) 0.0 - 0.8 mg/dL Final

## 2025-01-15 ENCOUNTER — TELEPHONE (OUTPATIENT)
Dept: RHEUMATOLOGY | Facility: CLINIC | Age: 50
End: 2025-01-15
Payer: COMMERCIAL

## 2025-01-15 NOTE — TELEPHONE ENCOUNTER
MTM referral from: [facility] visit (referral by provider)    MTM referral outreach attempt #2 on January 15, 2025 at 2:13 PM      Outcome: Patient not reachable after several attempts, routed to Pharmacist Team/Provider as an FYI    Use hbc for the carrier/Plan on the flowsheet      [employer] Message Sent    Tiffany Stanford CPhT  MTM

## 2025-01-22 NOTE — TELEPHONE ENCOUNTER
MTM Referral outreach attempt #3 was made on 01/22/2025.    Outcome: Called patient since they do not have an active [employer] account. Patient did not answer. A voicemail was left explaining more in-depth what MTM is and how we can best support them. Additionally, I provide my direct line so that the patient can call me directly for help with scheduling.

## 2025-02-12 ENCOUNTER — TELEPHONE (OUTPATIENT)
Dept: RHEUMATOLOGY | Facility: CLINIC | Age: 50
End: 2025-02-12
Payer: COMMERCIAL

## 2025-02-12 NOTE — TELEPHONE ENCOUNTER
PA Initiation    Medication: HUMIRA (2 PEN) 40 MG/0.4ML SC AJKT  Insurance Company: [email or web address] - Phone [phone] Fax [phone]  Pharmacy Filling the Rx: [facility] - [place], [place] - [address]  Filling Pharmacy Phone:    Filling Pharmacy Fax:    Start Date: 2/12/2025    [id number]

## 2025-02-17 NOTE — TELEPHONE ENCOUNTER
Prior Authorization Approval    Medication: HUMIRA (2 PEN) 40 MG/0.4ML SC AJKT  Authorization Effective Date: 2/17/2025  Authorization Expiration Date: 2/11/2026  Approved Dose/Quantity: 2  Reference #: [id number]   Insurance Company: [employer] - Phone [phone] Fax [phone]  Expected CoPay: $    CoPay Card Available: No    Financial Assistance Needed: no  Which Pharmacy is filling the prescription: [facility] - [place], [place] - [address]  Pharmacy Notified: no renewal  Patient Notified: yes via ins

## 2025-02-19 ENCOUNTER — LAB (OUTPATIENT)
Dept: LAB | Facility: CLINIC | Age: 50
End: 2025-02-19
Payer: COMMERCIAL

## 2025-02-19 ENCOUNTER — OFFICE VISIT (OUTPATIENT)
Dept: RHEUMATOLOGY | Facility: CLINIC | Age: 50
End: 2025-02-19
Payer: COMMERCIAL

## 2025-02-19 VITALS
WEIGHT: 203.6 LBS | BODY MASS INDEX: 28.36 KG/M2 | DIASTOLIC BLOOD PRESSURE: 98 MMHG | OXYGEN SATURATION: 99 % | HEART RATE: 67 BPM | SYSTOLIC BLOOD PRESSURE: 155 MMHG

## 2025-02-19 DIAGNOSIS — M19.011 OSTEOARTHRITIS OF GLENOHUMERAL JOINTS, BILATERAL: Primary | ICD-10-CM

## 2025-02-19 DIAGNOSIS — Z79.899 HIGH RISK MEDICATION USE: ICD-10-CM

## 2025-02-19 DIAGNOSIS — M19.012 OSTEOARTHRITIS OF GLENOHUMERAL JOINTS, BILATERAL: Primary | ICD-10-CM

## 2025-02-19 DIAGNOSIS — Z96.643 HISTORY OF BILATERAL HIP ARTHROPLASTY: ICD-10-CM

## 2025-02-19 LAB
ALBUMIN SERPL BCG-MCNC: 4.4 G/DL (ref 3.5–5.2)
ALT SERPL W P-5'-P-CCNC: 25 U/L (ref 0–70)
CREAT SERPL-MCNC: 0.9 MG/DL (ref 0.67–1.17)
EGFRCR SERPLBLD CKD-EPI 2021: >90 ML/MIN/1.73M2
ERYTHROCYTE [DISTWIDTH] IN BLOOD BY AUTOMATED COUNT: 11.7 % (ref 10–15)
HCT VFR BLD AUTO: 47.6 % (ref 40–53)
HGB BLD-MCNC: 16.2 G/DL (ref 13.3–17.7)
MCH RBC QN AUTO: 30.3 PG (ref 26.5–33)
MCHC RBC AUTO-ENTMCNC: 34 G/DL (ref 31.5–36.5)
MCV RBC AUTO: 89 FL (ref 78–100)
PLATELET # BLD AUTO: 254 10E3/UL (ref 150–450)
RBC # BLD AUTO: 5.34 10E6/UL (ref 4.4–5.9)
WBC # BLD AUTO: 9.7 10E3/UL (ref 4–11)

## 2025-02-19 PROCEDURE — 99214 OFFICE O/P EST MOD 30 MIN: CPT | Performed by: INTERNAL MEDICINE

## 2025-02-19 PROCEDURE — 82040 ASSAY OF SERUM ALBUMIN: CPT

## 2025-02-19 PROCEDURE — G2211 COMPLEX E/M VISIT ADD ON: HCPCS | Performed by: INTERNAL MEDICINE

## 2025-02-19 PROCEDURE — 85027 COMPLETE CBC AUTOMATED: CPT

## 2025-02-19 PROCEDURE — 84460 ALANINE AMINO (ALT) (SGPT): CPT

## 2025-02-19 PROCEDURE — 36415 COLL VENOUS BLD VENIPUNCTURE: CPT

## 2025-02-19 PROCEDURE — 82565 ASSAY OF CREATININE: CPT

## 2025-02-19 NOTE — PROGRESS NOTES
Rheumatology follow-up visit note     Jesus is a 49 year old male presents today for follow-up.    Jesus was seen today for recheck.    Diagnoses and all orders for this visit:    Osteoarthritis of glenohumeral joints, bilateral    History of bilateral hip arthroplasty    High risk medication use  -     ALT; Standing  -     Albumin level; Standing  -     CBC with platelets; Standing  -     Creatinine; Standing        The longitudinal plan of care for the diagnosis(es)/condition(s) as documented were addressed during this visit. Due to the added complexity in care, I will continue to support Jesus in the subsequent management and with ongoing continuity of care.      Follow up in 5 months.    HPI    Jesus Olivares is a 49 year old male is here for follow-up of recent visit he has been no has background of ankylosing spondylitis for which he is on Humira with good benefit.  He had noted pain in his shoulders bilaterally.  Further workup and COVID x-rays were taken which I have showed him today.  He has moderately severe degenerative changes in the glenohumeral joints.  He was able to identify for example the osteophyte formation.  Today we had a detailed discussion around how to manage osteoarthritis.  He has experienced with some of the modalities of treatment such as has had bilateral hip replacement in the past.  We discussed acetaminophen.  Corticosteroid injections.  We discussed the duloxetine that he has in the past taken for other reasons.  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  There is no history of trauma to the shoulders.  Following is the excerpt from a previous note:    He has recently been to orthopedics for consideration for repeat arthroplasty of the left hip and was told that it might be something that takes place towards the latter part of this year.  He also takes Tylenol on a as needed basis  typically once a day.     DETAILED EXAMINATION  02/19/25  :    Vitals:    02/19/25 0922 02/19/25 0924   BP: (!) 155/98 (!) 155/98   BP Location: Right arm Left arm   Pulse: 67    SpO2: 99%    Weight: 92.4 kg (203 lb 9.6 oz)      Alert oriented. Head including the face is examined for malar rash, heliotropes, scarring, lupus pernio. Eyes examined for redness such as in episcleritis/scleritis, periorbital lesions.   Neck/ Face examined for parotid gland swelling, range of motion of neck.  Left upper and lower and right upper and lower extremities examined for tenderness, swelling, warmth of the appendicular joints, range of motion, edema, rash.  Some of the important findings included: he does not have evidence of synovitis in the palpable joints of the upper extremities.  No significant deformities of the digits.  no Heberden nodes.  Range of motion of the shoulders  show full abduction.  No JLT effusion or warmth of the knees.  he does not have dactylitis of the digits.     Patient Active Problem List    Diagnosis Date Noted    History of colonic polyps 11/02/2023     Priority: Medium     Sessile polyp descending colon, tubular adenoma by biopsy      Erectile dysfunction due to arterial insufficiency 04/12/2023     Priority: Medium    History of repair of hip joint 01/06/2020     Priority: Medium     Total hip replacement on both sides      Hemorrhoids 06/17/2019     Priority: Medium     hemorrhoidectomy      Healthcare maintenance 01/09/2014     Priority: Medium     Colonoscopy:has never had- referred 4/23  PSA:  Prostate Specific Antigen Screen   Date Value Ref Range Status   04/12/2023 0.79 0.00 - 2.50 ng/mL Final     4/23:  The 10-year ASCVD risk score (Joy SAVAGE, et al., 2019) is: 3.7%    Values used to calculate the score:      Age: 48 years      Sex: Male      Is Non- : No      Diabetic: No      Tobacco smoker: No      Systolic Blood Pressure: 120 mmHg      Is BP treated: No      HDL  "Cholesterol: 38 mg/dL      Total Cholesterol: 199 mg/dL      Allergic rhinitis 09/01/2013     Priority: Medium    Mild intermittent asthma 09/01/2013     Priority: Medium    Ankylosing spondylitis (H) 01/15/2008     Priority: Medium     Last rheumatology appointment 2/22    Jesus is a 46 year old male presents today for follow-up.     Jesus was seen today for recheck.     Diagnoses and all orders for this visit:     Ankylosing spondylitis of sacral region (H)  -     etanercept (ENBREL SURECLICK) 50 MG/ML autoinjector; INJECT THE CONTENTS OF ONE PEN UNDER THE SKIN EVERY SEVEN DAYS     Lumbar spondylosis     History of bilateral hip arthroplasty     High risk medication use  -     Albumin level; Standing  -     ALT; Standing  -     CBC with platelets; Standing  -     Creatinine; Standing           This patient's ankylosing spondylitis symptoms are better with Enbrel, since he switched from Cosentyx.  He continues to have what he describes as \"hip pain\" pointing to the lateral trochanteric area.  He is status post bilateral hip arthroplasty several years ago.  He is planning to go see the orthopedics in the near future.  We will meet here in 6 months labs today continue Enbrel as now.       No past surgical history on file.   Past Medical History:   Diagnosis Date    Hypertension      No Known Allergies  Current Outpatient Medications   Medication Sig Dispense Refill    ACETAMINOPHEN 500 MG OR TABS 2 tablets as needed      Adalimumab (HUMIRA, 2 PEN,) 40 MG/0.4ML pen kit Inject 0.4 mLs (40 mg) subcutaneously every 14 days. 0.8 mL 5    albuterol (PROAIR HFA/PROVENTIL HFA/VENTOLIN HFA) 108 (90 Base) MCG/ACT inhaler Inhale 2 puffs into the lungs every 6 hours as needed for shortness of breath, wheezing or cough. 18 g 0    Carboxymethylcellul-Glycerin (CLEAR EYES FOR DRY EYES OP) Apply 1 drop to eye daily as needed      HUMIRA, 2 PEN, 40 MG/0.4ML pen kit INJECT THE CONTENTS OF 1 AUTOINJECTOR PEN UNDER THE SKIN EVERY " OTHER WEEK. HOLD FOR SIGNS OF INFECTION, THEN SEEK MEDICAL ATTENTION. (Patient not taking: Reported on 1/13/2025) 2 each 0    naproxen (NAPROSYN) 500 MG tablet TAKE 1 TABLET BY MOUTH TWICE A DAY WITH FOOD 60 tablet 2     family history is not on file.  Social Connections: Not on file          WBC   Date Value Ref Range Status   10/23/2014 6.5 4.0 - 11.0 10e9/L Final     WBC Count   Date Value Ref Range Status   10/10/2024 5.9 4.0 - 11.0 10e3/uL Final     RBC Count   Date Value Ref Range Status   10/10/2024 4.64 4.40 - 5.90 10e6/uL Final   10/23/2014 4.70 4.4 - 5.9 10e12/L Final     Hemoglobin   Date Value Ref Range Status   10/10/2024 14.1 13.3 - 17.7 g/dL Final   10/23/2014 15.1 13.3 - 17.7 g/dL Final     Hematocrit   Date Value Ref Range Status   10/10/2024 41.7 40.0 - 53.0 % Final   10/23/2014 43.6 40.0 - 53.0 % Final     MCV   Date Value Ref Range Status   10/10/2024 90 78 - 100 fL Final   10/23/2014 93 78 - 100 fl Final     MCH   Date Value Ref Range Status   10/10/2024 30.4 26.5 - 33.0 pg Final   10/23/2014 32.1 26.5 - 33.0 pg Final     Platelet Count   Date Value Ref Range Status   10/10/2024 212 150 - 450 10e3/uL Final   10/23/2014 198 150 - 450 10e9/L Final     % Lymphocytes   Date Value Ref Range Status   10/10/2024 53 % Final   02/01/2012 20 20 - 48 %      AST   Date Value Ref Range Status   04/12/2023 27 10 - 50 U/L Final   10/23/2014 20 0 - 45 U/L Final     ALT   Date Value Ref Range Status   10/07/2024 22 0 - 70 U/L Final   10/23/2014 29 0 - 70 U/L Final     Albumin   Date Value Ref Range Status   10/07/2024 3.8 3.5 - 5.2 g/dL Final   02/10/2022 3.8 3.5 - 5.0 g/dL Final   07/27/2009 4.2 3.9 - 5.1 g/dL Final     Alkaline Phosphatase   Date Value Ref Range Status   04/12/2023 63 40 - 129 U/L Final   03/27/2007 60 40 - 150 U/L Final     Creatinine   Date Value Ref Range Status   10/10/2024 0.91 0.67 - 1.17 mg/dL Final   10/23/2014 0.87 0.66 - 1.25 mg/dL Final     GFR Estimate   Date Value Ref Range Status    10/10/2024 >90 >60 mL/min/1.73m2 Final     Comment:     eGFR calculated using 2021 CKD-EPI equation.   04/21/2021 >60 >60 mL/min/1.73m2 Final   10/23/2014 >90  Non  GFR Calc   >60 mL/min/1.7m2 Final     GFR Estimate If Black   Date Value Ref Range Status   04/21/2021 >60 >60 mL/min/1.73m2 Final   10/23/2014 >90   GFR Calc   >60 mL/min/1.7m2 Final     Sed Rate   Date Value Ref Range Status   04/10/2013 13 0 - 15 mm/h Final     Erythrocyte Sedimentation Rate   Date Value Ref Range Status   10/07/2024 49 (H) 0 - 20 mm/hr Final     CRP Inflammation   Date Value Ref Range Status   10/23/2014 <2.9 0.0 - 8.0 mg/L Final     CRP   Date Value Ref Range Status   09/09/2019 5.2 (H) 0.0 - 0.8 mg/dL Final

## 2025-04-07 ENCOUNTER — OFFICE VISIT (OUTPATIENT)
Dept: FAMILY MEDICINE | Facility: CLINIC | Age: 50
End: 2025-04-07
Payer: COMMERCIAL

## 2025-04-07 VITALS
WEIGHT: 205 LBS | OXYGEN SATURATION: 98 % | HEIGHT: 71 IN | DIASTOLIC BLOOD PRESSURE: 91 MMHG | TEMPERATURE: 97 F | RESPIRATION RATE: 21 BRPM | HEART RATE: 68 BPM | BODY MASS INDEX: 28.7 KG/M2 | SYSTOLIC BLOOD PRESSURE: 144 MMHG

## 2025-04-07 DIAGNOSIS — M45.9 ANKYLOSING SPONDYLITIS, UNSPECIFIED SITE OF SPINE (H): ICD-10-CM

## 2025-04-07 DIAGNOSIS — Z00.00 ANNUAL PHYSICAL EXAM: Primary | ICD-10-CM

## 2025-04-07 DIAGNOSIS — Z00.00 HEALTHCARE MAINTENANCE: ICD-10-CM

## 2025-04-07 DIAGNOSIS — Z79.899 HIGH RISK MEDICATION USE: ICD-10-CM

## 2025-04-07 DIAGNOSIS — J45.20 MILD INTERMITTENT ASTHMA WITHOUT COMPLICATION: ICD-10-CM

## 2025-04-07 DIAGNOSIS — Z12.5 SCREENING FOR PROSTATE CANCER: ICD-10-CM

## 2025-04-07 DIAGNOSIS — I10 BENIGN ESSENTIAL HYPERTENSION: ICD-10-CM

## 2025-04-07 LAB
ALBUMIN SERPL BCG-MCNC: 4.4 G/DL (ref 3.5–5.2)
ALT SERPL W P-5'-P-CCNC: 33 U/L (ref 0–70)
CHOLEST SERPL-MCNC: 189 MG/DL
CREAT SERPL-MCNC: 0.98 MG/DL (ref 0.67–1.17)
EGFRCR SERPLBLD CKD-EPI 2021: >90 ML/MIN/1.73M2
ERYTHROCYTE [DISTWIDTH] IN BLOOD BY AUTOMATED COUNT: 11.6 % (ref 10–15)
FASTING STATUS PATIENT QL REPORTED: YES
HCT VFR BLD AUTO: 45.5 % (ref 40–53)
HDLC SERPL-MCNC: 38 MG/DL
HGB BLD-MCNC: 15.9 G/DL (ref 13.3–17.7)
LDLC SERPL CALC-MCNC: 106 MG/DL
MCH RBC QN AUTO: 31.1 PG (ref 26.5–33)
MCHC RBC AUTO-ENTMCNC: 34.9 G/DL (ref 31.5–36.5)
MCV RBC AUTO: 89 FL (ref 78–100)
NONHDLC SERPL-MCNC: 151 MG/DL
PLATELET # BLD AUTO: 212 10E3/UL (ref 150–450)
PSA SERPL DL<=0.01 NG/ML-MCNC: 0.68 NG/ML (ref 0–3.5)
RBC # BLD AUTO: 5.11 10E6/UL (ref 4.4–5.9)
TRIGL SERPL-MCNC: 225 MG/DL
WBC # BLD AUTO: 7.8 10E3/UL (ref 4–11)

## 2025-04-07 PROCEDURE — 80061 LIPID PANEL: CPT | Performed by: STUDENT IN AN ORGANIZED HEALTH CARE EDUCATION/TRAINING PROGRAM

## 2025-04-07 PROCEDURE — 3080F DIAST BP >= 90 MM HG: CPT | Performed by: STUDENT IN AN ORGANIZED HEALTH CARE EDUCATION/TRAINING PROGRAM

## 2025-04-07 PROCEDURE — 99214 OFFICE O/P EST MOD 30 MIN: CPT | Mod: 25 | Performed by: STUDENT IN AN ORGANIZED HEALTH CARE EDUCATION/TRAINING PROGRAM

## 2025-04-07 PROCEDURE — 85027 COMPLETE CBC AUTOMATED: CPT | Performed by: STUDENT IN AN ORGANIZED HEALTH CARE EDUCATION/TRAINING PROGRAM

## 2025-04-07 PROCEDURE — 90480 ADMN SARSCOV2 VAC 1/ONLY CMP: CPT | Performed by: STUDENT IN AN ORGANIZED HEALTH CARE EDUCATION/TRAINING PROGRAM

## 2025-04-07 PROCEDURE — 3077F SYST BP >= 140 MM HG: CPT | Performed by: STUDENT IN AN ORGANIZED HEALTH CARE EDUCATION/TRAINING PROGRAM

## 2025-04-07 PROCEDURE — G0103 PSA SCREENING: HCPCS | Performed by: STUDENT IN AN ORGANIZED HEALTH CARE EDUCATION/TRAINING PROGRAM

## 2025-04-07 PROCEDURE — 82040 ASSAY OF SERUM ALBUMIN: CPT | Performed by: STUDENT IN AN ORGANIZED HEALTH CARE EDUCATION/TRAINING PROGRAM

## 2025-04-07 PROCEDURE — 99396 PREV VISIT EST AGE 40-64: CPT | Mod: 25 | Performed by: STUDENT IN AN ORGANIZED HEALTH CARE EDUCATION/TRAINING PROGRAM

## 2025-04-07 PROCEDURE — 1126F AMNT PAIN NOTED NONE PRSNT: CPT | Performed by: STUDENT IN AN ORGANIZED HEALTH CARE EDUCATION/TRAINING PROGRAM

## 2025-04-07 PROCEDURE — 91320 SARSCV2 VAC 30MCG TRS-SUC IM: CPT | Performed by: STUDENT IN AN ORGANIZED HEALTH CARE EDUCATION/TRAINING PROGRAM

## 2025-04-07 PROCEDURE — 82565 ASSAY OF CREATININE: CPT | Performed by: STUDENT IN AN ORGANIZED HEALTH CARE EDUCATION/TRAINING PROGRAM

## 2025-04-07 PROCEDURE — G2211 COMPLEX E/M VISIT ADD ON: HCPCS | Performed by: STUDENT IN AN ORGANIZED HEALTH CARE EDUCATION/TRAINING PROGRAM

## 2025-04-07 PROCEDURE — 84460 ALANINE AMINO (ALT) (SGPT): CPT | Performed by: STUDENT IN AN ORGANIZED HEALTH CARE EDUCATION/TRAINING PROGRAM

## 2025-04-07 RX ORDER — LISINOPRIL 10 MG/1
10 TABLET ORAL DAILY
Qty: 30 TABLET | Refills: 1 | Status: SHIPPED | OUTPATIENT
Start: 2025-04-07

## 2025-04-07 ASSESSMENT — ASTHMA QUESTIONNAIRES
QUESTION_2 LAST FOUR WEEKS HOW OFTEN HAVE YOU HAD SHORTNESS OF BREATH: NOT AT ALL
ACT_TOTALSCORE: 25
QUESTION_3 LAST FOUR WEEKS HOW OFTEN DID YOUR ASTHMA SYMPTOMS (WHEEZING, COUGHING, SHORTNESS OF BREATH, CHEST TIGHTNESS OR PAIN) WAKE YOU UP AT NIGHT OR EARLIER THAN USUAL IN THE MORNING: NOT AT ALL
QUESTION_4 LAST FOUR WEEKS HOW OFTEN HAVE YOU USED YOUR RESCUE INHALER OR NEBULIZER MEDICATION (SUCH AS ALBUTEROL): NOT AT ALL
QUESTION_1 LAST FOUR WEEKS HOW MUCH OF THE TIME DID YOUR ASTHMA KEEP YOU FROM GETTING AS MUCH DONE AT WORK, SCHOOL OR AT HOME: NONE OF THE TIME
QUESTION_5 LAST FOUR WEEKS HOW WOULD YOU RATE YOUR ASTHMA CONTROL: COMPLETELY CONTROLLED

## 2025-04-07 ASSESSMENT — PAIN SCALES - GENERAL: PAINLEVEL_OUTOF10: NO PAIN (0)

## 2025-04-07 NOTE — PROGRESS NOTES
Assessment and Plan   50-year-old male with past medical history of ankylosing spondylitis and asthma who presents for high blood pressure as well as annual physical exam    1. Annual physical exam (Primary)  - Lipid panel reflex to direct LDL Fasting; Future    2. Ankylosing spondylitis, unspecified site of spine (H)  4/25:  Sees rheumatology. He is no on humira due to insurance coverage with enbrel. He uses naproxen and tylenol as needed and is doing exercises which help keep his pain under control    3. Mild intermittent asthma without complication  Albuterol as needed        10/22/2024     8:53 AM 4/7/2025     7:02 AM   ACT Total Scores   ACT TOTAL SCORE (Goal Greater than or Equal to 20) 15 25    In the past 12 months, how many times did you visit the emergency room for your asthma without being admitted to the hospital? 0 0   In the past 12 months, how many times were you hospitalized overnight because of your asthma? 0 0       Patient-reported       4. Benign essential hypertension  New diagnosis of benign essential hypertension today.  Blood pressure has been high over the last 6 months.  144/91 today.  We will start him on lisinopril 10 mg daily.  Also discussed behavioral interventions.  Follow-up in a month regarding blood pressure.  - lisinopril (ZESTRIL) 10 MG tablet; Take 1 tablet (10 mg) by mouth daily.  Dispense: 30 tablet; Refill: 1    5. Screening for prostate cancer  - PSA, screen; Future    Follow up: 1 month for HTN    The longitudinal plan of care for the diagnosis(es)/condition(s) as documented were addressed during this visit. Due to the added complexity in care, I will continue to support Jesus in the subsequent management and with ongoing continuity of care.    Dr. Tod Landry         HPI:   Jesus Olivares is a 50 year old  male who presents for:    Chief Complaint   Patient presents with    Hypertension     Would like covid vaccine      BP Readings from Last 6 Encounters:    04/07/25 (!) 144/91   02/19/25 (!) 155/98   01/13/25 (!) 156/96   10/22/24 (!) 140/80   10/10/24 (!) 170/104   12/05/23 120/82     He is noticing more stress over the last several months. Personal situation he does not want to talk about and has lori more stressful at work.  Feels stress is better. He does not drinks  lot of caffeine. Has gained about 13 lbs in the last several months. He is exercising a couple times a week.     Colonoscopy: 10/23- repeat in 10 years  PSA:  Prostate Specific Antigen Screen   Date Value Ref Range Status   04/12/2023 0.79 0.00 - 2.50 ng/mL Final     4/23:  The 10-year ASCVD risk score (Joy SAVAGE, et al., 2019) is: 7.2%    Values used to calculate the score:      Age: 50 years      Sex: Male      Is Non- : No      Diabetic: No      Tobacco smoker: No      Systolic Blood Pressure: 144 mmHg      Is BP treated: Yes      HDL Cholesterol: 38 mg/dL      Total Cholesterol: 199 mg/dL         PMHX:     Patient Active Problem List   Diagnosis    Ankylosing spondylitis (H)    Healthcare maintenance    Allergic rhinitis    Hemorrhoids    History of repair of hip joint    Mild intermittent asthma    Erectile dysfunction due to arterial insufficiency    History of colonic polyps       Social History     Tobacco Use    Smoking status: Never    Smokeless tobacco: Never   Substance Use Topics    Alcohol use: Yes     Comment: social not very often    Drug use: No       Social History     Social History Narrative    wife- santa    no children    live in [place]    works for [employer]    Met wife through [employer].      He was born in [place] but adopted in the [place]       No Known Allergies    No results found for this or any previous visit (from the past 24 hours).         Review of Systems:    ROS: 10 point ROS neg other than the symptoms noted above in the HPI.         Physical Exam:     Vitals:    04/07/25 0709 04/07/25 0710   BP: (!) 156/97 (!) 144/91  "  Pulse: 68    Resp: 21    Temp: 97  F (36.1  C)    SpO2: 98%    Weight: 93 kg (205 lb)    Height: 1.804 m (5' 11.04\")      Body mass index is 28.56 kg/m .    General appearance: Alert, cooperative, no distress, appears stated age  Head: Normocephalic, atraumatic, without obvious abnormality  Eyes: Pupils equal round, reactive.  Conjunctiva clear.  Nose: Nares normal, no drainage.  Throat: Lips, mucosa, tongue normal mucosa pink and moist  Neck: Supple, symmetric, trachea midline,  Lungs: Clear to auscultation bilaterally, no wheezing or crackles present.  Respirations unlabored  Heart: Regular rate and rhythm, normal S1 and S2, no murmur, rub or gallop.  Abdomen: Soft, nontender, nondistended.  No masses or organomegaly.  Extremities: Extremities normal, atraumatic.  No cyanosis or edema.  Skin: Skin color, texture, turgor normal no rashes or lesions on limited skin exam  Neurologic: CN II through XII intact, normal strength.            " Opioid Pregnancy And Lactation Text: These medications can lead to premature delivery and should be avoided during pregnancy. These medications are also present in breast milk in small amounts.

## 2025-04-08 NOTE — RESULT ENCOUNTER NOTE
Jesus Olivares  Your results from your recent clinic visit show:  Your lipids look ok and I used these as well as other factors from your history to calculate your 10 year risk of having something like a heart attack (ASCVD risk) and it was low risk. Just continue to work on exercise and diet to maintain this low risk.    The 10-year ASCVD risk score (Joy SAVAGE, et al., 2019) is: 6.7%    Values used to calculate the score:      Age: 50 years      Sex: Male      Is Non- : No      Diabetic: No      Tobacco smoker: No      Systolic Blood Pressure: 144 mmHg      Is BP treated: Yes      HDL Cholesterol: 38 mg/dL      Total Cholesterol: 189 mg/dL    Your PSA, which is a screen for prostate cancer, was normal    If you have more questions please call the clinic at [phone] or send me a [email or web address] message    Dr. Tod Landyr

## 2025-04-19 DIAGNOSIS — M45.5 ANKYLOSING SPONDYLITIS OF THORACOLUMBAR REGION (H): ICD-10-CM

## 2025-04-19 DIAGNOSIS — Z00.00 ENCOUNTER FOR GENERAL ADULT MEDICAL EXAMINATION WITHOUT ABNORMAL FINDINGS: ICD-10-CM

## 2025-04-21 RX ORDER — NAPROXEN 500 MG/1
500 TABLET ORAL 2 TIMES DAILY PRN
Qty: 60 TABLET | Refills: 2 | Status: SHIPPED | OUTPATIENT
Start: 2025-04-21

## 2025-04-30 DIAGNOSIS — I10 BENIGN ESSENTIAL HYPERTENSION: ICD-10-CM

## 2025-04-30 RX ORDER — LISINOPRIL 10 MG/1
10 TABLET ORAL DAILY
Qty: 90 TABLET | Refills: 1 | OUTPATIENT
Start: 2025-04-30

## 2025-05-13 ENCOUNTER — OFFICE VISIT (OUTPATIENT)
Dept: FAMILY MEDICINE | Facility: CLINIC | Age: 50
End: 2025-05-13
Payer: COMMERCIAL

## 2025-05-13 VITALS
TEMPERATURE: 98 F | WEIGHT: 204 LBS | BODY MASS INDEX: 28.56 KG/M2 | RESPIRATION RATE: 20 BRPM | HEIGHT: 71 IN | DIASTOLIC BLOOD PRESSURE: 80 MMHG | HEART RATE: 73 BPM | OXYGEN SATURATION: 98 % | SYSTOLIC BLOOD PRESSURE: 135 MMHG

## 2025-05-13 DIAGNOSIS — I10 BENIGN ESSENTIAL HYPERTENSION: Primary | ICD-10-CM

## 2025-05-13 DIAGNOSIS — R73.9 HYPERGLYCEMIA: ICD-10-CM

## 2025-05-13 PROCEDURE — 36415 COLL VENOUS BLD VENIPUNCTURE: CPT | Performed by: STUDENT IN AN ORGANIZED HEALTH CARE EDUCATION/TRAINING PROGRAM

## 2025-05-13 PROCEDURE — 3079F DIAST BP 80-89 MM HG: CPT | Performed by: STUDENT IN AN ORGANIZED HEALTH CARE EDUCATION/TRAINING PROGRAM

## 2025-05-13 PROCEDURE — 80048 BASIC METABOLIC PNL TOTAL CA: CPT | Performed by: STUDENT IN AN ORGANIZED HEALTH CARE EDUCATION/TRAINING PROGRAM

## 2025-05-13 PROCEDURE — 1126F AMNT PAIN NOTED NONE PRSNT: CPT | Performed by: STUDENT IN AN ORGANIZED HEALTH CARE EDUCATION/TRAINING PROGRAM

## 2025-05-13 PROCEDURE — 3075F SYST BP GE 130 - 139MM HG: CPT | Performed by: STUDENT IN AN ORGANIZED HEALTH CARE EDUCATION/TRAINING PROGRAM

## 2025-05-13 PROCEDURE — 99213 OFFICE O/P EST LOW 20 MIN: CPT | Performed by: STUDENT IN AN ORGANIZED HEALTH CARE EDUCATION/TRAINING PROGRAM

## 2025-05-13 RX ORDER — LISINOPRIL 10 MG/1
10 TABLET ORAL DAILY
Qty: 90 TABLET | Refills: 3 | Status: SHIPPED | OUTPATIENT
Start: 2025-05-13

## 2025-05-13 ASSESSMENT — PAIN SCALES - GENERAL: PAINLEVEL_OUTOF10: NO PAIN (0)

## 2025-05-13 NOTE — PROGRESS NOTES
Assessment and Plan   50-year-old male with past medical history of benign essential hypertension newly diagnosed on his physical a month ago.  Started patient on lisinopril which he started a couple days ago.  Blood pressure controlled today at 135/80.  No side effects.  Will continue him on this in the long-term.  Recheck BMP.    1. Benign essential hypertension (Primary)  - lisinopril (ZESTRIL) 10 MG tablet; Take 1 tablet (10 mg) by mouth daily.  Dispense: 90 tablet; Refill: 3  - Basic metabolic panel  (Ca, Cl, CO2, Creat, Gluc, K, Na, BUN); Future    Follow up: next year for physical    The longitudinal plan of care for the diagnosis(es)/condition(s) as documented were addressed during this visit. Due to the added complexity in care, I will continue to support Jesus in the subsequent management and with ongoing continuity of care.    Dr. Tod Landry         HPI:   Jesus Olivares is a 50 year old  male who presents for:    Chief Complaint   Patient presents with    Hypertension     4/25:  New diagnosis of benign essential hypertension today.  Blood pressure has been high over the last 6 months.  144/91 today.  We will start him on lisinopril 10 mg daily.  Also discussed behavioral interventions.  Follow-up in a month regarding blood pressure.    5/25:  Patient does well on medicine. BP controlled today. Not checking at home. Did not start until a couple of days ago but this would be enough time to see an effect. No side effects. Will check BMP today.  Otherwise will continue in the long term.     HTN overview:  - BP goal: <140/90   - Comorbidities (CKD/CAD/DM): No  - Currently taking meds: Lisinopril 10 mg daily  - Any side effects: none  - Last BMP:   Last Comprehensive Metabolic Panel:  Lab Results   Component Value Date     10/10/2024    POTASSIUM 4.6 10/10/2024    CHLORIDE 104 10/10/2024    CO2 22 10/10/2024    ANIONGAP 13 10/10/2024    GLC 92 10/10/2024    BUN 13.1 10/10/2024    CR 0.98  "04/07/2025    GFRESTIMATED >90 04/07/2025    RIN 8.4 (L) 10/10/2024       - Any symptoms of HTN such as chest pain, headaches, vision changes, nausea: none    BP Readings from Last 6 Encounters:   05/13/25 135/80   04/07/25 (!) 144/91   02/19/25 (!) 155/98   01/13/25 (!) 156/96   10/22/24 (!) 140/80   10/10/24 (!) 170/104            PMHX:     Patient Active Problem List   Diagnosis    Ankylosing spondylitis (H)    Healthcare maintenance    Allergic rhinitis    Hemorrhoids    History of repair of hip joint    Mild intermittent asthma    Erectile dysfunction due to arterial insufficiency    History of colonic polyps    Benign essential hypertension       Social History     Tobacco Use    Smoking status: Never    Smokeless tobacco: Never   Substance Use Topics    Alcohol use: Yes     Comment: social not very often    Drug use: No       Social History     Social History Narrative    wife- santa    no children    live in [place]    works for [employer]    Met wife through [employer].      He was born in [place] but adopted [place] the [place]       No Known Allergies    No results found for this or any previous visit (from the past 24 hours).         Review of Systems:    ROS: 10 point ROS neg other than the symptoms noted above in the HPI.         Physical Exam:     Vitals:    05/13/25 0810   BP: 135/80   Pulse: 73   Resp: 20   Temp: 98  F (36.7  C)   SpO2: 98%   Weight: 92.5 kg (204 lb)   Height: 1.803 m (5' 11\")     Body mass index is 28.45 kg/m .    General appearance: Alert, cooperative, no distress, appears stated age  Head: Normocephalic, atraumatic, without obvious abnormality  Eyes: Pupils equal round, reactive.  Conjunctiva clear.  Nose: Nares normal, no drainage.  Throat: Lips, mucosa, tongue normal mucosa pink and moist  Neck: Supple, symmetric, trachea midline,  Heart: Regular rate and rhythm, normal S1 and S2, no murmur, rub or gallop.          "

## 2025-05-14 ENCOUNTER — RESULTS FOLLOW-UP (OUTPATIENT)
Dept: FAMILY MEDICINE | Facility: CLINIC | Age: 50
End: 2025-05-14
Payer: COMMERCIAL

## 2025-05-14 LAB
ANION GAP SERPL CALCULATED.3IONS-SCNC: 12 MMOL/L (ref 7–15)
BUN SERPL-MCNC: 16.4 MG/DL (ref 6–20)
CALCIUM SERPL-MCNC: 9 MG/DL (ref 8.8–10.4)
CHLORIDE SERPL-SCNC: 105 MMOL/L (ref 98–107)
CREAT SERPL-MCNC: 0.79 MG/DL (ref 0.67–1.17)
EGFRCR SERPLBLD CKD-EPI 2021: >90 ML/MIN/1.73M2
GLUCOSE SERPL-MCNC: 166 MG/DL (ref 70–99)
HCO3 SERPL-SCNC: 24 MMOL/L (ref 22–29)
POTASSIUM SERPL-SCNC: 4.2 MMOL/L (ref 3.4–5.3)
SODIUM SERPL-SCNC: 141 MMOL/L (ref 135–145)

## 2025-05-14 NOTE — RESULT ENCOUNTER NOTE
Please call patient and inform them,    Jesus Olivares  Your results from your recent clinic visit show:  Your blood sugar was high. Please make a lab visit to draw a diabetic lab called an A1C to look for diabetes    If you have more questions please call the clinic at [phone] or send me a [employer] message    Dr. Tod Landry

## 2025-05-14 NOTE — TELEPHONE ENCOUNTER
Writer called patient and left message to return call to clinic.     If patient returns call please relay below results per provider and route to nurse queue if patient has further questions or concerns.     Per Dr. Hill  Please call patient and inform them,     Jesus Olivares   Your results from your recent clinic visit show:   Your blood sugar was high. Please make a lab visit to draw a diabetic lab called an A1C to look for diabetes     If you have more questions please call the clinic at [phone] or send me a MyChart message     Tiffany Avila RN  [facility]

## 2025-05-15 NOTE — TELEPHONE ENCOUNTER
RN called Jesus on 5/15 and left a message to return call to clinic.      TC if patient returns call please relay below results per provider and route to nurse queue if patient has further questions or concerns. .    Camilo Daily RN  [facility]

## 2025-05-15 NOTE — TELEPHONE ENCOUNTER
Patient returning call to clinic, writer relayed message and results from provider. Patient is agreeable to follow up lab draw and lab only appointment scheduled for 5/19/25.      ROMMEL NicoleN, RN  [facility]

## 2025-05-19 ENCOUNTER — LAB (OUTPATIENT)
Dept: LAB | Facility: CLINIC | Age: 50
End: 2025-05-19
Payer: COMMERCIAL

## 2025-05-19 DIAGNOSIS — R73.9 HYPERGLYCEMIA: ICD-10-CM

## 2025-05-19 PROBLEM — R73.03 PREDIABETES: Status: ACTIVE | Noted: 2025-05-19

## 2025-05-19 LAB
EST. AVERAGE GLUCOSE BLD GHB EST-MCNC: 123 MG/DL
HBA1C MFR BLD: 5.9 % (ref 0–5.6)

## 2025-05-19 PROCEDURE — 36415 COLL VENOUS BLD VENIPUNCTURE: CPT

## 2025-05-19 PROCEDURE — 83036 HEMOGLOBIN GLYCOSYLATED A1C: CPT

## 2025-07-22 DIAGNOSIS — M45.8 ANKYLOSING SPONDYLITIS OF SACRAL REGION (H): ICD-10-CM

## 2025-07-22 RX ORDER — ADALIMUMAB 40MG/0.4ML
KIT SUBCUTANEOUS
Qty: 2 EACH | Refills: 2 | Status: SHIPPED | OUTPATIENT
Start: 2025-07-22